# Patient Record
Sex: FEMALE | Race: WHITE | NOT HISPANIC OR LATINO | Employment: OTHER | URBAN - METROPOLITAN AREA
[De-identification: names, ages, dates, MRNs, and addresses within clinical notes are randomized per-mention and may not be internally consistent; named-entity substitution may affect disease eponyms.]

---

## 2017-01-05 ENCOUNTER — PRENATAL OFFICE VISIT (OUTPATIENT)
Dept: OBGYN | Facility: CLINIC | Age: 32
End: 2017-01-05
Payer: COMMERCIAL

## 2017-01-05 ENCOUNTER — OFFICE VISIT (OUTPATIENT)
Dept: MATERNAL FETAL MEDICINE | Facility: CLINIC | Age: 32
End: 2017-01-05
Attending: OBSTETRICS & GYNECOLOGY
Payer: COMMERCIAL

## 2017-01-05 ENCOUNTER — HOSPITAL ENCOUNTER (OUTPATIENT)
Dept: ULTRASOUND IMAGING | Facility: CLINIC | Age: 32
Discharge: HOME | End: 2017-01-05
Attending: OBSTETRICS & GYNECOLOGY | Admitting: OBSTETRICS & GYNECOLOGY
Payer: COMMERCIAL

## 2017-01-05 VITALS
SYSTOLIC BLOOD PRESSURE: 108 MMHG | HEART RATE: 71 BPM | RESPIRATION RATE: 18 BRPM | OXYGEN SATURATION: 99 % | DIASTOLIC BLOOD PRESSURE: 56 MMHG | WEIGHT: 260.4 LBS | TEMPERATURE: 98.1 F | HEIGHT: 67 IN | BODY MASS INDEX: 40.87 KG/M2

## 2017-01-05 DIAGNOSIS — Z34.02 SUPERVISION OF NORMAL FIRST PREGNANCY IN SECOND TRIMESTER: Primary | ICD-10-CM

## 2017-01-05 DIAGNOSIS — O34.592: Primary | ICD-10-CM

## 2017-01-05 DIAGNOSIS — O34.592: ICD-10-CM

## 2017-01-05 PROCEDURE — 76805 OB US >/= 14 WKS SNGL FETUS: CPT

## 2017-01-05 PROCEDURE — 99207 ZZC PRENATAL VISIT: CPT | Performed by: OBSTETRICS & GYNECOLOGY

## 2017-01-05 NOTE — NURSING NOTE
"Chief Complaint   Patient presents with     Prenatal Care     16 weeks 2 days     Imm/Inj     questions regarding whooping cough vaccine-      Initial /56 mmHg  Pulse 71  Temp(Src) 98.1  F (36.7  C) (Oral)  Resp 18  Ht 5' 6.75\" (1.695 m)  Wt 260 lb 6.4 oz (118.117 kg)  BMI 41.11 kg/m2  SpO2 99%  LMP 08/25/2016 Estimated body mass index is 41.11 kg/(m^2) as calculated from the following:    Height as of this encounter: 5' 6.75\" (1.695 m).    Weight as of this encounter: 260 lb 6.4 oz (118.117 kg).  BP completed using cuff size large Right Arm        Jostin Rivers CMA      "

## 2017-01-05 NOTE — PROGRESS NOTES
"Please see \"imaging\" tab under \"Chart Review\" for details of today's US at the [facility].    Ronn Major MD  Maternal-Fetal Medicine    "

## 2017-01-05 NOTE — PROGRESS NOTES
PROBLEM LIST  1. Low risk primip: Apos/RI/first screen:nl    Ultrasound ordered for anatomy. Discussed all results to date: nl first screen, question of uterine anomaly but on further review, just arcuate so no further follow up needed.  Follow up 4 weeks.    Pao Fleming MD

## 2017-01-25 ENCOUNTER — RADIANT APPOINTMENT (OUTPATIENT)
Dept: ULTRASOUND IMAGING | Facility: CLINIC | Age: 32
End: 2017-01-25
Attending: OBSTETRICS & GYNECOLOGY
Payer: COMMERCIAL

## 2017-01-25 DIAGNOSIS — Z34.02 SUPERVISION OF NORMAL FIRST PREGNANCY IN SECOND TRIMESTER: ICD-10-CM

## 2017-01-25 PROCEDURE — 76805 OB US >/= 14 WKS SNGL FETUS: CPT | Performed by: OBSTETRICS & GYNECOLOGY

## 2017-02-03 ENCOUNTER — PRENATAL OFFICE VISIT (OUTPATIENT)
Dept: OBGYN | Facility: CLINIC | Age: 32
End: 2017-02-03
Payer: COMMERCIAL

## 2017-02-03 VITALS
DIASTOLIC BLOOD PRESSURE: 60 MMHG | WEIGHT: 267.8 LBS | HEART RATE: 86 BPM | BODY MASS INDEX: 42.03 KG/M2 | RESPIRATION RATE: 18 BRPM | SYSTOLIC BLOOD PRESSURE: 104 MMHG | TEMPERATURE: 98.2 F | HEIGHT: 67 IN

## 2017-02-03 DIAGNOSIS — Z34.02 SUPERVISION OF NORMAL FIRST PREGNANCY IN SECOND TRIMESTER: Primary | ICD-10-CM

## 2017-02-03 PROCEDURE — 99207 ZZC PRENATAL VISIT: CPT | Performed by: OBSTETRICS & GYNECOLOGY

## 2017-02-03 PROCEDURE — 82105 ALPHA-FETOPROTEIN SERUM: CPT | Mod: 90 | Performed by: OBSTETRICS & GYNECOLOGY

## 2017-02-03 PROCEDURE — 36415 COLL VENOUS BLD VENIPUNCTURE: CPT | Performed by: OBSTETRICS & GYNECOLOGY

## 2017-02-03 PROCEDURE — 99000 SPECIMEN HANDLING OFFICE-LAB: CPT | Performed by: OBSTETRICS & GYNECOLOGY

## 2017-02-03 NOTE — NURSING NOTE
"Chief Complaint   Patient presents with     Prenatal Care     20 weeks 3 days     Derm Problem     bikini area, itching- pt reports sx for the past week, she does have a rash there, has not shaved or waxed area.     Initial /60 mmHg  Pulse 86  Temp(Src) 98.2  F (36.8  C) (Oral)  Resp 18  Ht 5' 6.75\" (1.695 m)  Wt 267 lb 12.8 oz (121.473 kg)  BMI 42.28 kg/m2  LMP 08/25/2016 Estimated body mass index is 42.28 kg/(m^2) as calculated from the following:    Height as of this encounter: 5' 6.75\" (1.695 m).    Weight as of this encounter: 267 lb 12.8 oz (121.473 kg).  BP completed using cuff size large Right Arm      Jostin Rivers CMA      "

## 2017-02-03 NOTE — PROGRESS NOTES
PROBLEM LIST  1. Low risk primip: Apos/RI/first screen:nl AFP:  Ultrasound: within normal limits, BOY.    Reviewed normal anatomy scan: Boy. AFP today after discussion.   Follow up 4 weeks.    Pao Fleming MD

## 2017-02-06 LAB
# FETUSES US: NORMAL
AFP ADJ MOM AMN: 1.02
AFP SERPL-MCNC: 42 NG/ML
AGE - REPORTED: 32.7
DATING METHOD: NORMAL
DIABETIC AT CONCEPTION: NO
FAMILY MEMBER DISEASES HX: NO
FAMILY MEMBER DISEASES HX: NO
GA METHOD: NORMAL
GA: 20.43 WK
HX OF HEREDITARY DISORDERS: NO
IDDM PATIENT QL: NO
INTEGRATED SCN PATIENT-IMP: NORMAL
LMP START DATE: NORMAL
PREV HX CHROMOSOME ABNORMALITY: NO
SPECIMEN DRAWN SERPL: NORMAL
TWINS: NORMAL

## 2017-03-10 ENCOUNTER — PRENATAL OFFICE VISIT (OUTPATIENT)
Dept: OBGYN | Facility: CLINIC | Age: 32
End: 2017-03-10
Payer: COMMERCIAL

## 2017-03-10 VITALS
RESPIRATION RATE: 18 BRPM | DIASTOLIC BLOOD PRESSURE: 60 MMHG | HEART RATE: 86 BPM | BODY MASS INDEX: 43.1 KG/M2 | HEIGHT: 67 IN | WEIGHT: 274.6 LBS | SYSTOLIC BLOOD PRESSURE: 118 MMHG | TEMPERATURE: 97.6 F

## 2017-03-10 DIAGNOSIS — Z34.02 SUPERVISION OF NORMAL FIRST PREGNANCY IN SECOND TRIMESTER: Primary | ICD-10-CM

## 2017-03-10 PROCEDURE — 99207 ZZC PRENATAL VISIT: CPT | Performed by: OBSTETRICS & GYNECOLOGY

## 2017-03-10 NOTE — MR AVS SNAPSHOT
After Visit Summary   3/10/2017    Yudelka Clarke    MRN: [id number]           Patient Information     Date Of Birth          1985        Visit Information        Provider Department      3/10/2017 9:45 AM Pao Fleming MD [facility]        Today's Diagnoses     Supervision of normal first pregnancy in second trimester    -  1       Follow-ups after your visit        Your next 10 appointments already scheduled     Mar 31, 2017 11:15 AM CDT   ESTABLISHED PRENATAL with Pao Fleming MD   [facility]    [address] [place] [id number] [phone]              Who to contact     If you have questions or need follow up information about today's clinic visit or your schedule please contact [facility] directly at [phone].  Normal or non-critical lab and imaging results will be communicated to you by MyChart, letter or phone within 4 business days after the clinic has received the results. If you do not hear from us within 7 days, please contact the clinic through ANDalyzehart or phone. If you have a critical or abnormal lab result, we will notify you by phone as soon as possible.  Submit refill requests through BAT[employer]IES [employer] BANDS or call your pharmacy and they will forward the refill request to us. Please allow 3 business days for your refill to be completed.          Additional Information About Your Visit        MyChart Information     [employer] gives you secure access to your electronic health record. If you see a primary care provider, you can also send messages to your care team and make appointments. If you have questions, please call your primary care clinic.  If you do not have a primary care provider, please call [phone] and they will assist you.        Care EveryWhere ID     This is your Care EveryWhere ID. This could be used by other organizations to access your [facility]  "records  [id number]        Your Vitals Were     Pulse Temperature Respirations Height Last Period BMI (Body Mass Index)    86 97.6  F (36.4  C) (Oral) 18 5' 6.75\" (1.695 m) 08/25/2016 43.33 kg/m2       Blood Pressure from Last 3 Encounters:   03/10/17 118/60   02/03/17 104/60   01/05/17 108/56    Weight from Last 3 Encounters:   03/10/17 274 lb 9.6 oz (124.6 kg)   02/03/17 267 lb 12.8 oz (121.5 kg)   01/05/17 260 lb 6.4 oz (118.1 kg)              Today, you had the following     No orders found for display       Primary Care Provider Office Phone # Fax #    Stevo Laurent Barros [phone]       [facility] [address]  [facility] [address]        Thank you!     Thank you for choosing [facility]  for your care. Our goal is always to provide you with excellent care. Hearing back from our patients is one way we can continue to improve our services. Please take a few minutes to complete the written survey that you may receive in the mail after your visit with us. Thank you!             Your Updated Medication List - Protect others around you: Learn how to safely use, store and throw away your medicines at www.disposemymeds.org.          This list is accurate as of: 3/10/17 10:14 AM.  Always use your most recent med list.                   Brand Name Dispense Instructions for use    PRENATAL PO            "

## 2017-03-10 NOTE — PROGRESS NOTES
PROBLEM LIST  1. Low risk primip: Apos/RI/first screen:nl AFP:  Ultrasound: within normal limits, BOY.    AFP within normal limits. No issues, good fetal movement. Tdap and glucose tolerance test next time.  Follow up 4 weeks.    Pao Fleming MD

## 2017-03-31 ENCOUNTER — PRENATAL OFFICE VISIT (OUTPATIENT)
Dept: OBGYN | Facility: CLINIC | Age: 32
End: 2017-03-31
Payer: COMMERCIAL

## 2017-03-31 VITALS
WEIGHT: 277.2 LBS | BODY MASS INDEX: 43.51 KG/M2 | HEART RATE: 86 BPM | SYSTOLIC BLOOD PRESSURE: 106 MMHG | HEIGHT: 67 IN | OXYGEN SATURATION: 98 % | RESPIRATION RATE: 16 BRPM | DIASTOLIC BLOOD PRESSURE: 70 MMHG

## 2017-03-31 DIAGNOSIS — Z34.03 SUPERVISION OF NORMAL FIRST PREGNANCY IN THIRD TRIMESTER: Primary | ICD-10-CM

## 2017-03-31 LAB
ERYTHROCYTE [DISTWIDTH] IN BLOOD BY AUTOMATED COUNT: 13.2 % (ref 10–15)
HCT VFR BLD AUTO: 34.4 % (ref 35–47)
HGB BLD-MCNC: 11.4 G/DL (ref 11.7–15.7)
MCH RBC QN AUTO: 29.4 PG (ref 26.5–33)
MCHC RBC AUTO-ENTMCNC: 33.1 G/DL (ref 31.5–36.5)
MCV RBC AUTO: 89 FL (ref 78–100)
PLATELET # BLD AUTO: 237 10E9/L (ref 150–450)
RBC # BLD AUTO: 3.88 10E12/L (ref 3.8–5.2)
WBC # BLD AUTO: 11.7 10E9/L (ref 4–11)

## 2017-03-31 PROCEDURE — 85027 COMPLETE CBC AUTOMATED: CPT | Performed by: OBSTETRICS & GYNECOLOGY

## 2017-03-31 PROCEDURE — 90715 TDAP VACCINE 7 YRS/> IM: CPT | Performed by: OBSTETRICS & GYNECOLOGY

## 2017-03-31 PROCEDURE — 36415 COLL VENOUS BLD VENIPUNCTURE: CPT | Performed by: OBSTETRICS & GYNECOLOGY

## 2017-03-31 PROCEDURE — 90471 IMMUNIZATION ADMIN: CPT | Performed by: OBSTETRICS & GYNECOLOGY

## 2017-03-31 PROCEDURE — 99207 ZZC PRENATAL VISIT: CPT | Performed by: OBSTETRICS & GYNECOLOGY

## 2017-03-31 PROCEDURE — 82950 GLUCOSE TEST: CPT | Performed by: OBSTETRICS & GYNECOLOGY

## 2017-03-31 NOTE — PROGRESS NOTES
PROBLEM LIST  1. Low risk primip: Apos/RI/first screen:nl AFP:  Ultrasound: within normal limits, BOY.    Tdap and glucose tolerance test today. Follow up in 2 weeks: will make all appts.  Pao Fleming MD

## 2017-03-31 NOTE — MR AVS SNAPSHOT
"              After Visit Summary   3/31/2017    Yudelka Clarke    MRN: [id number]           Patient Information     Date Of Birth          1985        Visit Information        Provider Department      3/31/2017 11:15 AM Pao Fleming MD [facility]        Today's Diagnoses     Supervision of normal first pregnancy in third trimester    -  1       Follow-ups after your visit        Who to contact     If you have questions or need follow up information about today's clinic visit or your schedule please contact [facility] directly at [phone].  Normal or non-critical lab and imaging results will be communicated to you by Cubiclhart, letter or phone within 4 business days after the clinic has received the results. If you do not hear from us within 7 days, please contact the clinic through Forevert or phone. If you have a critical or abnormal lab result, we will notify you by phone as soon as possible.  Submit refill requests through ZipList or call your pharmacy and they will forward the refill request to us. Please allow 3 business days for your refill to be completed.          Additional Information About Your Visit        MyChart Information     ZipList gives you secure access to your electronic health record. If you see a primary care provider, you can also send messages to your care team and make appointments. If you have questions, please call your primary care clinic.  If you do not have a primary care provider, please call [phone] and they will assist you.        Care EveryWhere ID     This is your Care EveryWhere ID. This could be used by other organizations to access your [facility] medical records  [id number]        Your Vitals Were     Pulse Respirations Height Last Period Pulse Oximetry BMI (Body Mass Index)    86 16 5' 6.75\" (1.695 m) 08/25/2016 98% 43.74 kg/m2       Blood Pressure from Last 3 Encounters:   03/31/17 106/70   03/10/17 118/60   02/03/17 " 104/60    Weight from Last 3 Encounters:   03/31/17 277 lb 3.2 oz (125.7 kg)   03/10/17 274 lb 9.6 oz (124.6 kg)   02/03/17 267 lb 12.8 oz (121.5 kg)              Today, you had the following     No orders found for display       Primary Care Provider Office Phone # Fax #    Stevo Laurent Barros [phone]       [facility] [address]  [facility] [address]        Thank you!     Thank you for choosing [facility]  for your care. Our goal is always to provide you with excellent care. Hearing back from our patients is one way we can continue to improve our services. Please take a few minutes to complete the written survey that you may receive in the mail after your visit with us. Thank you!             Your Updated Medication List - Protect others around you: Learn how to safely use, store and throw away your medicines at www.disposemymeds.org.          This list is accurate as of: 3/31/17 11:40 AM.  Always use your most recent med list.                   Brand Name Dispense Instructions for use    PRENATAL PO

## 2017-03-31 NOTE — NURSING NOTE
"Chief Complaint   Patient presents with     Prenatal Care     28 weeks        Initial /70 (BP Location: Right arm, Patient Position: Chair, Cuff Size: Adult Regular)  Pulse 86  Resp 16  Ht 5' 6.75\" (1.695 m)  Wt 277 lb 3.2 oz (125.7 kg)  LMP 08/25/2016  SpO2 98%  BMI 43.74 kg/m2 Estimated body mass index is 43.74 kg/(m^2) as calculated from the following:    Height as of this encounter: 5' 6.75\" (1.695 m).    Weight as of this encounter: 277 lb 3.2 oz (125.7 kg).  Medication Reconciliation: complete     Malia Reyes CMA    3/31/2017  11:27 AM      "

## 2017-04-01 LAB — GLUCOSE 1H P 50 G GLC PO SERPL-MCNC: 79 MG/DL (ref 60–129)

## 2017-04-10 ENCOUNTER — HOSPITAL ENCOUNTER (INPATIENT)
Facility: CLINIC | Age: 32
LOS: 3 days | Discharge: HOME HEALTH | End: 2017-04-13
Attending: OBSTETRICS & GYNECOLOGY | Admitting: OBSTETRICS & GYNECOLOGY
Payer: COMMERCIAL

## 2017-04-10 ENCOUNTER — TELEPHONE (OUTPATIENT)
Dept: NURSING | Facility: CLINIC | Age: 32
End: 2017-04-10

## 2017-04-10 PROBLEM — Z36.89 ENCOUNTER FOR TRIAGE IN PREGNANT PATIENT: Status: ACTIVE | Noted: 2017-04-10

## 2017-04-10 LAB
ABO + RH BLD: NORMAL
ABO + RH BLD: NORMAL
ALBUMIN UR-MCNC: NEGATIVE MG/DL
APPEARANCE UR: CLEAR
BILIRUB UR QL STRIP: NEGATIVE
BLD GP AB SCN SERPL QL: NORMAL
BLOOD BANK CMNT PATIENT-IMP: NORMAL
COLOR UR AUTO: NORMAL
GLUCOSE UR STRIP-MCNC: NEGATIVE MG/DL
HGB BLD-MCNC: 11.2 G/DL (ref 11.7–15.7)
HGB UR QL STRIP: NEGATIVE
KETONES UR STRIP-MCNC: NEGATIVE MG/DL
LEUKOCYTE ESTERASE UR QL STRIP: NEGATIVE
NITRATE UR QL: NEGATIVE
PH UR STRIP: 7 PH (ref 5–7)
RBC #/AREA URNS AUTO: <1 /HPF (ref 0–2)
SP GR UR STRIP: 1 (ref 1–1.03)
SPECIMEN EXP DATE BLD: NORMAL
T PALLIDUM IGG+IGM SER QL: NEGATIVE
URN SPEC COLLECT METH UR: NORMAL
UROBILINOGEN UR STRIP-MCNC: 0 MG/DL (ref 0–2)
WBC #/AREA URNS AUTO: <1 /HPF (ref 0–2)

## 2017-04-10 PROCEDURE — 85018 HEMOGLOBIN: CPT | Performed by: OBSTETRICS & GYNECOLOGY

## 2017-04-10 PROCEDURE — 86900 BLOOD TYPING SEROLOGIC ABO: CPT | Performed by: OBSTETRICS & GYNECOLOGY

## 2017-04-10 PROCEDURE — 25000125 ZZHC RX 250: Performed by: OBSTETRICS & GYNECOLOGY

## 2017-04-10 PROCEDURE — 86901 BLOOD TYPING SEROLOGIC RH(D): CPT | Performed by: OBSTETRICS & GYNECOLOGY

## 2017-04-10 PROCEDURE — 25000128 H RX IP 250 OP 636: Performed by: OBSTETRICS & GYNECOLOGY

## 2017-04-10 PROCEDURE — 25800025 ZZH RX 258: Performed by: OBSTETRICS & GYNECOLOGY

## 2017-04-10 PROCEDURE — 81001 URINALYSIS AUTO W/SCOPE: CPT | Performed by: OBSTETRICS & GYNECOLOGY

## 2017-04-10 PROCEDURE — 99232 SBSQ HOSP IP/OBS MODERATE 35: CPT | Performed by: NURSE PRACTITIONER

## 2017-04-10 PROCEDURE — 86850 RBC ANTIBODY SCREEN: CPT | Performed by: OBSTETRICS & GYNECOLOGY

## 2017-04-10 PROCEDURE — 36415 COLL VENOUS BLD VENIPUNCTURE: CPT | Performed by: OBSTETRICS & GYNECOLOGY

## 2017-04-10 PROCEDURE — 12000031 ZZH R&B OB CRITICAL

## 2017-04-10 PROCEDURE — 86780 TREPONEMA PALLIDUM: CPT | Performed by: OBSTETRICS & GYNECOLOGY

## 2017-04-10 RX ORDER — MAGNESIUM SULFATE IN WATER 40 MG/ML
2 INJECTION, SOLUTION INTRAVENOUS CONTINUOUS
Status: DISCONTINUED | OUTPATIENT
Start: 2017-04-10 | End: 2017-04-12

## 2017-04-10 RX ORDER — SODIUM CHLORIDE, SODIUM LACTATE, POTASSIUM CHLORIDE, CALCIUM CHLORIDE 600; 310; 30; 20 MG/100ML; MG/100ML; MG/100ML; MG/100ML
INJECTION, SOLUTION INTRAVENOUS CONTINUOUS
Status: DISCONTINUED | OUTPATIENT
Start: 2017-04-10 | End: 2017-04-12

## 2017-04-10 RX ORDER — METHYLERGONOVINE MALEATE 0.2 MG/ML
200 INJECTION INTRAVENOUS
Status: DISCONTINUED | OUTPATIENT
Start: 2017-04-10 | End: 2017-04-12

## 2017-04-10 RX ORDER — FENTANYL CITRATE 50 UG/ML
50-100 INJECTION, SOLUTION INTRAMUSCULAR; INTRAVENOUS
Status: DISCONTINUED | OUTPATIENT
Start: 2017-04-10 | End: 2017-04-12

## 2017-04-10 RX ORDER — ACETAMINOPHEN 325 MG/1
650 TABLET ORAL EVERY 4 HOURS PRN
Status: DISCONTINUED | OUTPATIENT
Start: 2017-04-10 | End: 2017-04-12

## 2017-04-10 RX ORDER — BETAMETHASONE SODIUM PHOSPHATE AND BETAMETHASONE ACETATE 3; 3 MG/ML; MG/ML
12 INJECTION, SUSPENSION INTRA-ARTICULAR; INTRALESIONAL; INTRAMUSCULAR; SOFT TISSUE
Status: COMPLETED | OUTPATIENT
Start: 2017-04-10 | End: 2017-04-11

## 2017-04-10 RX ORDER — NALOXONE HYDROCHLORIDE 0.4 MG/ML
.1-.4 INJECTION, SOLUTION INTRAMUSCULAR; INTRAVENOUS; SUBCUTANEOUS
Status: DISCONTINUED | OUTPATIENT
Start: 2017-04-10 | End: 2017-04-12

## 2017-04-10 RX ORDER — OXYTOCIN/0.9 % SODIUM CHLORIDE 30/500 ML
100-340 PLASTIC BAG, INJECTION (ML) INTRAVENOUS CONTINUOUS PRN
Status: COMPLETED | OUTPATIENT
Start: 2017-04-10 | End: 2017-04-11

## 2017-04-10 RX ORDER — IBUPROFEN 800 MG/1
800 TABLET, FILM COATED ORAL
Status: DISCONTINUED | OUTPATIENT
Start: 2017-04-10 | End: 2017-04-12

## 2017-04-10 RX ORDER — ONDANSETRON 2 MG/ML
4 INJECTION INTRAMUSCULAR; INTRAVENOUS EVERY 6 HOURS PRN
Status: DISCONTINUED | OUTPATIENT
Start: 2017-04-10 | End: 2017-04-12

## 2017-04-10 RX ORDER — OXYTOCIN 10 [USP'U]/ML
10 INJECTION, SOLUTION INTRAMUSCULAR; INTRAVENOUS
Status: DISCONTINUED | OUTPATIENT
Start: 2017-04-10 | End: 2017-04-12

## 2017-04-10 RX ORDER — OXYCODONE AND ACETAMINOPHEN 5; 325 MG/1; MG/1
1 TABLET ORAL
Status: DISCONTINUED | OUTPATIENT
Start: 2017-04-10 | End: 2017-04-12

## 2017-04-10 RX ORDER — MAGNESIUM SULFATE HEPTAHYDRATE 40 MG/ML
4 INJECTION, SOLUTION INTRAVENOUS ONCE
Status: COMPLETED | OUTPATIENT
Start: 2017-04-10 | End: 2017-04-10

## 2017-04-10 RX ORDER — MAGNESIUM SULFATE HEPTAHYDRATE 40 MG/ML
4 INJECTION, SOLUTION INTRAVENOUS
Status: DISCONTINUED | OUTPATIENT
Start: 2017-04-10 | End: 2017-04-12

## 2017-04-10 RX ORDER — CALCIUM GLUCONATE 94 MG/ML
1 INJECTION, SOLUTION INTRAVENOUS
Status: DISCONTINUED | OUTPATIENT
Start: 2017-04-10 | End: 2017-04-12

## 2017-04-10 RX ORDER — CARBOPROST TROMETHAMINE 250 UG/ML
250 INJECTION, SOLUTION INTRAMUSCULAR
Status: DISCONTINUED | OUTPATIENT
Start: 2017-04-10 | End: 2017-04-12

## 2017-04-10 RX ADMIN — SODIUM CHLORIDE, POTASSIUM CHLORIDE, SODIUM LACTATE AND CALCIUM CHLORIDE: 600; 310; 30; 20 INJECTION, SOLUTION INTRAVENOUS at 05:13

## 2017-04-10 RX ADMIN — BETAMETHASONE SODIUM PHOSPHATE AND BETAMETHASONE ACETATE 12 MG: 3; 3 INJECTION, SUSPENSION INTRA-ARTICULAR; INTRALESIONAL; INTRAMUSCULAR at 02:54

## 2017-04-10 RX ADMIN — MAGNESIUM SULFATE IN WATER 4 G: 40 INJECTION, SOLUTION INTRAVENOUS at 02:51

## 2017-04-10 RX ADMIN — MAGNESIUM SULFATE IN WATER 2 G/HR: 40 INJECTION, SOLUTION INTRAVENOUS at 03:25

## 2017-04-10 RX ADMIN — SODIUM CHLORIDE, POTASSIUM CHLORIDE, SODIUM LACTATE AND CALCIUM CHLORIDE 1000 ML: 600; 310; 30; 20 INJECTION, SOLUTION INTRAVENOUS at 22:11

## 2017-04-10 RX ADMIN — SODIUM CHLORIDE, POTASSIUM CHLORIDE, SODIUM LACTATE AND CALCIUM CHLORIDE 500 ML: 600; 310; 30; 20 INJECTION, SOLUTION INTRAVENOUS at 02:25

## 2017-04-10 RX ADMIN — MAGNESIUM SULFATE IN WATER 2 G/HR: 40 INJECTION, SOLUTION INTRAVENOUS at 23:23

## 2017-04-10 RX ADMIN — SODIUM CHLORIDE, POTASSIUM CHLORIDE, SODIUM LACTATE AND CALCIUM CHLORIDE: 600; 310; 30; 20 INJECTION, SOLUTION INTRAVENOUS at 11:18

## 2017-04-10 RX ADMIN — MAGNESIUM SULFATE IN WATER 2 G/HR: 40 INJECTION, SOLUTION INTRAVENOUS at 13:50

## 2017-04-10 NOTE — IP AVS SNAPSHOT
MRN:[id number]                      After Visit Summary   4/10/2017    Yudelka Clarke    MRN: [id number]           Thank you!     Thank you for choosing [facility] for your care. Our goal is always to provide you with excellent care. Hearing back from our patients is one way we can continue to improve our services. Please take a few minutes to complete the written survey that you may receive in the mail after you visit. If you would like to speak to someone directly about your visit please contact Patient Relations at [phone]. Thank you!          Patient Information     Date Of Birth          1985        Designated Caregiver       Most Recent Value    Caregiver    Will someone help with your care after discharge? yes    Name of designated caregiver self and spouse    Phone number of caregiver Jose Angel    Caregiver address [phone]      About your hospital stay     You were admitted on:  April 10, 2017 You last received care in the:  [facility]    You were discharged on:  April 13, 2017       Who to Call     For medical emergencies, please call 911.  For non-urgent questions about your medical care, please call your primary care provider or clinic, [phone]          Attending Provider     Provider Specialty    Jenna Pink MD OB/Gyn    Tom Dodd MD OB/Gyn    Edwin, Sharon Gan MD OB/Gyn    Lonnie, Pao VILLARREAL MD OB/Gyn       Primary Care Provider Office Phone # Fax #    Stevo Laurent Barros [phone]       [facility] [id number] [place] [id number]        Your next 10 appointments already scheduled     Apr 14, 2017  2:15 PM CDT   ESTABLISHED PRENATAL with Pao Fleming MD   [facility] ([facility])    [address] [facility] [id number] [phone]            Apr 28, 2017 11:30 AM CDT   ESTABLISHED PRENATAL with Pao Fleming MD   [facility] ([facility]    [address] [facility] [address]   [phone]            May 12, 2017 11:15 AM CDT   ESTABLISHED PRENATAL with Pao Fleming MD   [facility]    [address] [place] [address]   [phone]            May 26, 2017 11:15 AM CDT   ESTABLISHED PRENATAL with Pao Fleming MD   [facility]    [address] [place] [address]   [phone]            Jun 01, 2017 11:15 AM CDT   ESTABLISHED PRENATAL with Pao Fleming MD   [facility]    [address] [place] [phone]   [phone]            Jun 09, 2017 11:15 AM CDT   ESTABLISHED PRENATAL with Pao Fleming MD   [facility]    [address] [place] [address]   [phone]            Jun 16, 2017 11:15 AM CDT   ESTABLISHED PRENATAL with Pao Fleming MD   [facility]    [id number] [place] [address]   [phone]              Further instructions from your care team       Postpartum Vaginal Delivery Instructions  [facility] [phone]  Lactation Dept [phone]    Activity       Ask family and friends for help when you need it.    Do not place anything in your vagina for 6 weeks.    You are not restricted on other activities, but take it easy for a few weeks to allow your body to recover from delivery.  You are able to do any activities you feel up to that point.    No driving until you have stopped taking your pain medications (usually two weeks after delivery).     Call your health care provider if you have any of these symptoms:       Increased pain, swelling, redness, or fluid around your stiches from an  episiotomy or perineal tear.    A fever above 100.4 F (38 C) with or without chills when placing a thermometer under your tongue.    You soak a sanitary pad with blood within 1 hour, or you see blood clots larger than a golf ball.    Bleeding that lasts more than 6 weeks.    Vaginal discharge that smells bad.    Severe pain, cramping or tenderness in your lower belly area.    A need to urinate more frequently (use the toilet more often), more urgently (use the toilet very quickly), or it burns when you urinate.    Nausea and vomiting.    Redness, swelling or pain around a vein in your leg.    Problems breastfeeding or a red or painful area on your breast.    Chest pain and cough or are gasping for air.    Problems coping with sadness, anxiety, or depression.  If you have any concerns about hurting yourself or the baby, call your provider immediately.     You have questions or concerns after you return home.     Keep your hands clean:  Always wash your hands before touching your perineal area and stitches.  This helps reduce your risk of infection.  If your hands aren't dirty, you may use an alcohol hand-rub to clean your hands. Keep your nails clean and short.            Pending Results     Date and Time Order Name Status Description    4/11/2017 2341 Genital Special Culture Aerob Bacterial Preliminary     4/11/2017 2341 Genital Special Culture Aerob Bacterial Preliminary     4/11/2017 2341 Placenta Path Order and Indications (PLACENTA) In process     4/11/2017 1235 Referral sensitivity In process             Statement of Approval     Ordered          04/13/17 0902  I have reviewed and agree with all the recommendations and orders detailed in this document.  EFFECTIVE NOW     Approved and electronically signed by:  Pao Fleming MD             Admission Information     Date & Time Provider Department Dept. Phone    4/10/2017 Pao Fleming MD [facility] [phone]      Your Vitals Were      "Blood Pressure Temperature Respirations Height Weight Last Period    136/73 97.8  F (36.6  C) (Oral) 18 1.702 m (5' 7\") 125.6 kg (277 lb) 08/25/2016    Pulse Oximetry BMI (Body Mass Index)                95% 43.38 kg/m2          [employer] Information     [employer] gives you secure access to your electronic health record. If you see a primary care provider, you can also send messages to your care team and make appointments. If you have questions, please call your primary care clinic.  If you do not have a primary care provider, please call [phone] and they will assist you.        Care EveryWhere ID     This is your Care EveryWhere ID. This could be used by other organizations to access your [facility] medical records  [id number]           Review of your medicines      START taking        Dose / Directions    acetaminophen 325 MG tablet   Commonly known as:  TYLENOL        Dose:  650 mg   Take 2 tablets (650 mg) by mouth every 4 hours as needed for mild pain or fever (greater than or equal to 38? C /100.4? F (oral) or 38.5? C/ 101.4? F (core).)   Quantity:  100 tablet   Refills:  0       ibuprofen 200 MG tablet   Commonly known as:  ADVIL/MOTRIN        Dose:  600 mg   Take 3 tablets (600 mg) by mouth every 6 hours as needed for other (cramping)   Refills:  0       senna-docusate 8.6-50 MG per tablet   Commonly known as:  SENOKOT-S;PERICOLACE        Dose:  1-2 tablet   Take 1-2 tablets by mouth 2 times daily   Quantity:  100 tablet   Refills:  0         CONTINUE these medicines which have NOT CHANGED        Dose / Directions    PRENATAL PO        Refills:  0            Where to get your medicines      Some of these will need a paper prescription and others can be bought over the counter. Ask your nurse if you have questions.     You don't need a prescription for these medications     acetaminophen 325 MG tablet    ibuprofen 200 MG tablet    senna-docusate 8.6-50 MG per tablet                Protect others around you: " Learn how to safely use, store and throw away your medicines at www.disposemymeds.org.             Medication List: This is a list of all your medications and when to take them. Check marks below indicate your daily home schedule. Keep this list as a reference.      Medications           Morning Afternoon Evening Bedtime As Needed    acetaminophen 325 MG tablet   Commonly known as:  TYLENOL   Take 2 tablets (650 mg) by mouth every 4 hours as needed for mild pain or fever (greater than or equal to 38? C /100.4? F (oral) or 38.5? C/ 101.4? F (core).)                                ibuprofen 200 MG tablet   Commonly known as:  ADVIL/MOTRIN   Take 3 tablets (600 mg) by mouth every 6 hours as needed for other (cramping)                                PRENATAL PO   Last time this was given:  1 tablet on 4/13/2017  8:45 AM                                senna-docusate 8.6-50 MG per tablet   Commonly known as:  SENOKOT-S;PERICOLACE   Take 1-2 tablets by mouth 2 times daily   Last time this was given:  1 tablet on 4/13/2017  8:45 AM

## 2017-04-10 NOTE — H&P
[facility] Labor and Delivery History and Physical    Yudelka Clarke MRN# [id number]   Age: 31 year old YOB: 1985     Date of Admission:  4/10/2017    Primary care provider: Stevo Barros           Chief Complaint:   Yudelka Clarke is a 31 year old white female  Estimated Date of Delivery: 2017  who is 29w6d pregnant and being admitted for  labor.  The pt states that she began having regular ctx's about 7 pm last evening.  The ctx's subsided with fluids and rest but awoke her at 12:00 Midnight.  The pt then called and was asked to come to L&D for eval.  She denies known ppt'g event, recent intercourse or ROM.  She denies bowel or bladder sx's or complications this preg          Pregnancy history:     OBSTETRIC HISTORY:    Obstetric History       T0      TAB0   SAB0   E0   M0   L0       # Outcome Date GA Lbr Jose Luis/2nd Weight Sex Delivery Anes PTL Lv   1 Current                   EDC: Estimated Date of Delivery: 17    Prenatal Labs:   Lab Results   Component Value Date    ABO A 04/10/2017    RH  Pos 04/10/2017    AS Neg 04/10/2017    HEPBANG Nonreactive 2016    CHPCRT  2016     Negative   Negative for C. trachomatis rRNA by transcription mediated amplification.   A negative result by transcription mediated amplification does not preclude the   presence of C. trachomatis infection because results are dependent on proper   and adequate collection, absence of inhibitors, and sufficient rRNA to be   detected.      GCPCRT  2016     Negative   Negative for N. gonorrhoeae rRNA by transcription mediated amplification.   A negative result by transcription mediated amplification does not preclude the   presence of N. gonorrhoeae infection because results are dependent on proper   and adequate collection, absence of inhibitors, and sufficient rRNA to be   detected.      TREPAB Negative 2016    HGB 11.2 (L) 04/10/2017       GBS  Status:   No results found for: GBS    Active Problem List  Patient Active Problem List   Diagnosis     Mandibular hypoplasia     PCOS (polycystic ovarian syndrome)     Morbid obesity (H)     Blood type A+     Obesity in pregnancy     Encounter for triage in pregnant patient     Indication for care in labor or delivery       Medication Prior to Admission  Prescriptions Prior to Admission   Medication Sig Dispense Refill Last Dose     Prenatal Vit-Fe Fumarate-FA (PRENATAL PO)    Taking   .        Maternal Past Medical History:     Past Medical History:   Diagnosis Date     Blood type A+      Left forearm fracture     3 years old - cast     Right forearm fracture     4 years old - cast                       Family History:   I have reviewed this patient's family history            Social History:   I have reviewed this patient's social history         Review of Systems:   C: NEGATIVE for fever, chills, change in weight  I: NEGATIVE for worrisome rashes, moles or lesions  E: NEGATIVE for vision changes or irritation  E/M: NEGATIVE for ear, mouth and throat problems  R: NEGATIVE for significant cough or SOB  B: NEGATIVE for masses, tenderness or discharge  CV: NEGATIVE for chest pain, palpitations or peripheral edema  GI: NEGATIVE for nausea, abdominal pain, heartburn, or change in bowel habits  : NEGATIVE for frequency, dysuria, or hematuria  M: NEGATIVE for significant arthralgias or myalgia  N: NEGATIVE for weakness, dizziness or paresthesias  E: NEGATIVE for temperature intolerance, skin/hair changes  H: NEGATIVE for bleeding problems  P: NEGATIVE for changes in mood or affect          Physical Exam:   Vitals were reviewed  All vitals stable  Temp: 98.5  F (36.9  C) Temp src: Oral BP: 113/59     Resp: 16 SpO2: 95 %      Constitutional:   awake, alert, cooperative, no apparent distress, and appears stated age     Eyes:   Lids and lashes normal, pupils equal, round and reactive to light, extra ocular muscles intact,  sclera clear, conjunctiva normal     ENT:   Normocephalic, without obvious abnormality, atraumatic, sinuses nontender on palpation, external ears without lesions, oral pharynx with moist mucous membranes, tonsils without erythema or exudates, gums normal and good dentition.     Neck:   Supple, symmetrical, trachea midline, no adenopathy, thyroid symmetric, not enlarged and no tenderness, skin normal     Back:   Symmetric, no curvature, spinous processes are non-tender on palpation, paraspinous muscles are non-tender on palpation, no costal vertebral tenderness     Lungs:   No increased work of breathing, good air exchange, clear to auscultation bilaterally, no crackles or wheezing     Cardiovascular:   Normal apical impulse, regular rate and rhythm, normal S1 and S2, no S3 or S4, and no murmur noted     Abdomen:   No scars, normal bowel sounds, gravid uterus consistent with dates, barrera infant vtx (confirmed by U/S this am), non-tender, no masses palpated, no hepatosplenomegally  EFW 2 #     Genitounirinary:   External Genitalia:  General appearance; normal     Musculoskeletal:   There is no redness, warmth, or swelling of the joints.  Full range of motion noted.  Motor strength is 5 out of 5 all extremities bilaterally.  Tone is normal.     Neurologic:   Awake, alert, oriented to name, place and time.  Cranial nerves II-XII are grossly intact.  Motor is 5 out of 5 bilaterally.  Cerebellar finger to nose, heel to shin intact.  Sensory is intact.  Babinski down going, Romberg negative, and gait is normal.      Cervix:   Membranes: intact   Dilation: 3   Effacement: 90%   Station:-2   Consistency: average   Position: Mid  Presentation:Cephalic  Fetal Heart Rate Tracing: reactive and reassuring  Tocometer: external monitor and now irregular                       Assessment:   Yudelka Clarke is a 29w6d pregnant female admitted with  labor.          Plan:   Admit - see IP orders  MgSO4 and betamethasone  per protocol  Risks, benefits, and alternative modes of therapy discussed at length. Pathophysiology of the disease process reviewed, all of the patients questions answered and informed consent obtained.  Issues of prematurity reviewed   NNP to see pt  MFM consult  May consider deejay Dodd MD

## 2017-04-10 NOTE — PROVIDER NOTIFICATION
04/10/17 0822   Provider Notification   Provider Name/Title PERLA   Method of Notification At Bedside   Request Evaluate in Person   Notification Reason Status Update   US confirmed vertex resenting .  POC discussed with pt-will continue Magnesium Sulfate and give 2nd dose of Betamethsone as scheduled.  Consult for NNP will be placed.  Discussed GBS culture.  MD requested it be done prior to next cervical exam.

## 2017-04-10 NOTE — TELEPHONE ENCOUNTER
Call Type: Triage Call    Presenting Problem: 30 weeks pregnant, Woke with abdominal cramping,  off and on. Denies vaginal bleeding. No vomiting or fever. Has not  felt fetal movement for the last 30 minutes since she has been  awake. On call OB paged to call patient at [phone] @ 0119  Triage Note:  Guideline Title: Pregnancy: Abdominal Pain  Recommended Disposition: Call Provider Immediately  Original Inclination: Wanted to speak with a nurse  Override Disposition:  Intended Action: Call PCP/HCP  Physician Contacted: No  Generalized abdominal pain that progresses to localized pain ?  YES  Signs of dehydration ? NO  IUD or cerclage in place ? NO  Fluid leaking or rupture of membranes ? NO  Severe breathing problems ? NO  Recent version procedure ? NO  New or worsening signs and symptoms that may indicate shock ? NO  Less than 20 weeks gestation AND vaginal bleeding ? NO  More than 20 weeks gestation AND vaginal bleeding ? NO  Gestation more than 37 weeks AND signs of labor ? NO  Gestation less than 20 weeks AND signs of labor ? NO  20-37 weeks gestation AND signs of labor ? NO  Sudden onset or recurring abdominal pain that radiates to upper back or shoulder ?  NO  Passing red, black or tarry material from rectum AND onset of new signs and  symptoms of hypovolemia ? NO  Unbearable abdominal/pelvic pain or cramping ? NO  Any cardiac signs/symptoms for more than 5 minutes, now or within last hour ? NO  Vomiting red, bloody or coffee-ground material, more than streaks of blood or  scant amount (not following nosebleed within past day) ? NO  Temperature 100 F (37.7 C) or greater ? NO  Burning sensation in the epigastic area AND no other abdominal discomfort or other  related symptoms ? NO  Generalized abdominal pain that progresses to localized pain AND any of the  following: loss of appetite, vomiting starting after pain, any fever, OR unable  to carry out normal activities ? NO  Abdominal pain that has steadily  "worsened over hours OR has been continuous for 3  hours or more AND any of the following: loss of appetite, vomiting starting after  pain, any fever, OR unable to carry out normal activities ? NO  Sudden onset or recurring abdominal pain that radiates to upper back or shoulder  AND any of the following: loss of appetite, vomiting starting after pain, any  fever, OR unable to carry out normal activities ? NO  Pain in flank, low back, over bladder, groin or perineum AND sharp pain with  urination, or felt something \"pass\" with urination, or blood in urine ? NO  Recent amniocentesis/chorionic villus sampling ([facility]) ? NO  New onset of epigastric or right upper quadrant pain with known pregnancy induced  hypertension ? NO  Continuous bright red vaginal bleeding for more than 15 minutes (more than  spotting) ? NO  Gestation 20 weeks or more AND decreased fetal movement compared to previous  activity ? NO  Unbearable headache ? NO  Abdominal or pelvic pain that has steadily worsened; has been continuous for 3  hours or more; OR that had stopped and has now returned ? NO  Injury to abdomen or pelvis ? NO  Physician Instructions:  Care Advice:  "

## 2017-04-10 NOTE — PLAN OF CARE
Data: Patient presented to [facility] at 0203.   Reason for maternal/fetal assessment per patient is Rule Out Labor  .  Patient is a . Prenatal record reviewed.      Obstetric History       T0      TAB0   SAB0   E0   M0   L0       # Outcome Date GA Lbr Jose Luis/2nd Weight Sex Delivery Anes PTL Lv   1 Current               . Medical history:   Past Medical History:   Diagnosis Date     Blood type A+      Left forearm fracture     3 years old - cast     Right forearm fracture     4 years old - cast   . Gestational Age 29w6d. VSS. Fetal movement present. Patient denies backache, vaginal discharge, pelvic pressure, UTI symptoms, GI problems, bloody show, vaginal bleeding, edema, headache, visual disturbances, epigastric or URQ pain, abdominal pain, rupture of membranes. Support persons significan other  present.  Action: Verbal consent for EFM. Triage assessment completed. EFM applied for fetal assessment. Uterine assessment contractions every. Fetal assessment: Presumed adequate fetal oxygenation documented (see flow record).   Response: Dr. Pink informed of status. Plan per provider is admit to inpatient, administer magnesium sulfate and betamethasone, and recheck cervix 2 hours after bolus started. Patient verbalized agreement with plan. Patient transferred to room 404 ambulatory, oriented to room and call light.

## 2017-04-10 NOTE — CONSULTS
Neonatology Antepartum Consultation    Yudelka Clarke MRN# [id number]   YOB: 1985 Age: 31 year old   Date of Admission: 4/10/2017     Reason for consult: I was asked by Dr. Dodd  to evaluate this patient for  risk of delivery at 29w6d.         I was asked to provide antepartum counseling for Yudelka Clarke at the request of Dr. Dodd secondary to  labor.  She is currently 29w6d weeks and has a history significant PTL with buldging bag. She was admitted on 4/10/2017.. Betamethasone was administered on 4/10 ay 0300 with second dose anticipated 300. Ms. Clarke, accompanied by her spouse, was counseled on the expected hospital course, potential risks, and outcomes associated with an infant born at approximately 29-30 weeks gestation. The counseling included: morbidity,  initial delivery room stabilization, respiratory course, lung development, chronic lung disease,  infection  intraventricular hemorrhage,  nutrition, growth and development. Please feel free to call with any additional questions or concerns. Face to fact time 55 minutes  Leny WINN, CNP  4/10/2017 , 4:03 PM.

## 2017-04-10 NOTE — PROVIDER NOTIFICATION
04/10/17 0740   Provider Notification   Provider Name/Title PERLA   Method of Notification In Department

## 2017-04-10 NOTE — PROVIDER NOTIFICATION
04/10/17 0600   Provider Notification   Provider Name/Title Dr Dodd   Method of Notification Phone   Request Evaluate - Remote   Notification Reason Membrane Status;SVE;Status Update     Dr Dodd called department.  Informed of SVE that shows no change, bulging membranes, and inability to assess whether is vertex presentation.  MD plans on performing bedside ultrasound when rounding.  Category 1 tracing.

## 2017-04-10 NOTE — PROVIDER NOTIFICATION
04/10/17 0230   Provider Notification   Provider Name/Title Dr Pink   Method of Notification Phone   Request Evaluate - Remote   Notification Reason Patient Arrived;Decels;Uterine Activity;SVE;Status Update     Dr Pink updated on arrival, SVE of 3cm, 90 % effaced and -2 station.  Noted bulging bag with cervical exam, unable to verify vertex.  Category 1 tracing with the exception of 1 variable deceleration.  Contractions every 3-5 minutes and palpate mild to moderate.  Difficult to palpate at times due to patient size.  Plan to admit to inpatient, give magnesium sulfate bolus then continuous.  Administer betamethasone.  Also have NNP come and consult with pt.  Plan to recheck cervix 2 hours after magnesium started.

## 2017-04-10 NOTE — PROVIDER NOTIFICATION
04/10/17 0515   Provider Notification   Provider Name/Title Dr Pink   Method of Notification Electronic Page  (text page)   Request Evaluate - Remote   Notification Reason SVE;Labor Status;Other (Comment)     Text page sent to dr pink informing of SVE that is no change from previously, category 1 tracing, contractions spacing out some and pain is now 2/10 instead of 5/10.  With SVE unable to assess vertex presentation.  Informed MD to call if needed ultrasound during the night.

## 2017-04-10 NOTE — IP AVS SNAPSHOT
[facility]    [address]    [facility] [address]    Phone:  [phone]    Fax:  [phone]                                       After Visit Summary   4/10/2017    Yudelka Clarke    MRN: [id number]           After Visit Summary Signature Page     I have received my discharge instructions, and my questions have been answered. I have discussed any challenges I see with this plan with the nurse or doctor.    ..........................................................................................................................................  Patient/Patient Representative Signature      ..........................................................................................................................................  Patient Representative Print Name and Relationship to Patient    ..................................................               ................................................  Date                                            Time    ..........................................................................................................................................  Reviewed by Signature/Title    ...................................................              ..............................................  Date                                                            Time

## 2017-04-11 ENCOUNTER — APPOINTMENT (OUTPATIENT)
Dept: ULTRASOUND IMAGING | Facility: CLINIC | Age: 32
End: 2017-04-11
Attending: OBSTETRICS & GYNECOLOGY
Payer: COMMERCIAL

## 2017-04-11 PROCEDURE — 87070 CULTURE OTHR SPECIMN AEROBIC: CPT | Performed by: OBSTETRICS & GYNECOLOGY

## 2017-04-11 PROCEDURE — 0UQMXZZ REPAIR VULVA, EXTERNAL APPROACH: ICD-10-PCS | Performed by: OBSTETRICS & GYNECOLOGY

## 2017-04-11 PROCEDURE — 87653 STREP B DNA AMP PROBE: CPT | Performed by: OBSTETRICS & GYNECOLOGY

## 2017-04-11 PROCEDURE — 76815 OB US LIMITED FETUS(S): CPT

## 2017-04-11 PROCEDURE — 25800025 ZZH RX 258: Performed by: OBSTETRICS & GYNECOLOGY

## 2017-04-11 PROCEDURE — 88307 TISSUE EXAM BY PATHOLOGIST: CPT | Mod: 26 | Performed by: OBSTETRICS & GYNECOLOGY

## 2017-04-11 PROCEDURE — 12000031 ZZH R&B OB CRITICAL

## 2017-04-11 PROCEDURE — 25000132 ZZH RX MED GY IP 250 OP 250 PS 637: Performed by: OBSTETRICS & GYNECOLOGY

## 2017-04-11 PROCEDURE — 72200001 ZZH LABOR CARE VAGINAL DELIVERY SINGLE

## 2017-04-11 PROCEDURE — 25000128 H RX IP 250 OP 636: Performed by: OBSTETRICS & GYNECOLOGY

## 2017-04-11 PROCEDURE — 87186 SC STD MICRODIL/AGAR DIL: CPT | Performed by: OBSTETRICS & GYNECOLOGY

## 2017-04-11 PROCEDURE — 25000125 ZZHC RX 250: Performed by: OBSTETRICS & GYNECOLOGY

## 2017-04-11 PROCEDURE — 59400 OBSTETRICAL CARE: CPT | Performed by: OBSTETRICS & GYNECOLOGY

## 2017-04-11 PROCEDURE — 88307 TISSUE EXAM BY PATHOLOGIST: CPT | Performed by: OBSTETRICS & GYNECOLOGY

## 2017-04-11 RX ORDER — PENICILLIN G POTASSIUM 5000000 [IU]/1
5 INJECTION, POWDER, FOR SOLUTION INTRAMUSCULAR; INTRAVENOUS ONCE
Status: COMPLETED | OUTPATIENT
Start: 2017-04-11 | End: 2017-04-11

## 2017-04-11 RX ORDER — PRENATAL VIT/IRON FUM/FOLIC AC 27MG-0.8MG
1 TABLET ORAL DAILY
Status: DISCONTINUED | OUTPATIENT
Start: 2017-04-11 | End: 2017-04-12

## 2017-04-11 RX ORDER — DOCUSATE SODIUM 100 MG/1
100 CAPSULE, LIQUID FILLED ORAL 2 TIMES DAILY
Status: DISCONTINUED | OUTPATIENT
Start: 2017-04-11 | End: 2017-04-12

## 2017-04-11 RX ADMIN — MAGNESIUM SULFATE IN WATER 2 G/HR: 40 INJECTION, SOLUTION INTRAVENOUS at 20:57

## 2017-04-11 RX ADMIN — LIDOCAINE HYDROCHLORIDE 10 ML: 10 INJECTION, SOLUTION INFILTRATION; PERINEURAL at 02:22

## 2017-04-11 RX ADMIN — OXYTOCIN-SODIUM CHLORIDE 0.9% IV SOLN 30 UNIT/500ML 340 ML/HR: 30-0.9/5 SOLUTION at 22:22

## 2017-04-11 RX ADMIN — PRENATAL VIT W/ FE FUMARATE-FA TAB 27-0.8 MG 1 TABLET: 27-0.8 TAB at 13:44

## 2017-04-11 RX ADMIN — PENICILLIN G POTASSIUM 5 MILLION UNITS: 5000000 POWDER, FOR SOLUTION INTRAMUSCULAR; INTRAPLEURAL; INTRATHECAL; INTRAVENOUS at 21:59

## 2017-04-11 RX ADMIN — SODIUM CHLORIDE, POTASSIUM CHLORIDE, SODIUM LACTATE AND CALCIUM CHLORIDE: 600; 310; 30; 20 INJECTION, SOLUTION INTRAVENOUS at 12:40

## 2017-04-11 RX ADMIN — MAGNESIUM SULFATE IN WATER 2 G/HR: 40 INJECTION, SOLUTION INTRAVENOUS at 10:21

## 2017-04-11 RX ADMIN — DOCUSATE SODIUM 100 MG: 100 CAPSULE, LIQUID FILLED ORAL at 13:45

## 2017-04-11 RX ADMIN — BETAMETHASONE SODIUM PHOSPHATE AND BETAMETHASONE ACETATE 12 MG: 3; 3 INJECTION, SUSPENSION INTRA-ARTICULAR; INTRALESIONAL; INTRAMUSCULAR at 03:05

## 2017-04-11 NOTE — PROGRESS NOTES
"[facility]   Antepartum Progress Note          Assessment and Plan:    Assessment:   HD#2:  labor.   Admitted with regular uterine contractions, painful, with cervical dilation 3/90/-2.   Started on MgSO4 for tocolysis/neuroprotection, Betamethasone (second dose received this AM at approximately 3 am  Doing well, decreased contractions, denies pain, loss of fluid, vaginal bleeding.   +fetal movement.               Plan:   Betamethasone completed; will continue MgSO4 until 24 hours following last dose of betamethasone.      Will order growth scan, can do at bedside while on MgSO4  Will do GBS test today.                  Significant Problems:      Patient Active Problem List   Diagnosis     Mandibular hypoplasia     PCOS (polycystic ovarian syndrome)     Morbid obesity (H)     Blood type A+     Obesity in pregnancy     Encounter for triage in pregnant patient     Indication for care in labor or delivery             Review of Systems:    The Review of Systems is negative other than noted in the HPI          Medications:   All medications related to the patient's surgery have been reviewed  Current Facility-Administered Medications   Medication     lactated ringers infusion     lactated ringers BOLUS 1,000 mL    Or     lactated ringers BOLUS 500 mL     acetaminophen (TYLENOL) tablet 650 mg     naloxone (NARCAN) injection 0.1-0.4 mg     ondansetron (ZOFRAN) injection 4 mg     oxytocin (PITOCIN) injection 10 Units     oxytocin (PITOCIN) 30 units in 500 mL 0.9% NaCl infusion     Medication Instructions: misoprostol (CYTOTEC)- Nurse to discuss ordering with provider, if needed. Ordered via \"OB misoprostol (CYTOTEC) Postpartum Hemorrhage PANEL\"     methylergonovine (METHERGINE) injection 200 mcg     carboprost (HEMABATE) injection 250 mcg     lidocaine 1 % 0.1-20 mL     ibuprofen (ADVIL/MOTRIN) tablet 800 mg     oxyCODONE-acetaminophen (PERCOCET) 5-325 MG per tablet 1 tablet     nitrous oxide/oxygen " "50/50 blend     fentaNYL Citrate (PF) (SUBLIMAZE) injection  mcg     magnesium sulfate 20 g in 500 mL infusion     calcium gluconate 10 % injection 1 g     magnesium sulfate 2 g in NS intermittent infusion ([facility] or [facility] C[facility])     magnesium sulfate 4 g in 100 mL sterile water (premade)             Physical Exam:   All vitals stable    Vital signs:  Temp: 98.4  F (36.9  C) Temp src: Oral BP: 109/63   Heart Rate: 84 Resp: 18       Height: 170.2 cm (5' 7\") Weight: 125.6 kg (277 lb)  Estimated body mass index is 43.38 kg/(m^2) as calculated from the following:    Height as of this encounter: 1.702 m (5' 7\").    Weight as of this encounter: 125.6 kg (277 lb).    Physical exam:  GENERAL APPEARANCE: healthy, alert and no distress  RESP: lungs clear to auscultation - no rales, rhonchi or wheezes  CV: regular rates and rhythm, normal S1 S2, no S3 or S4 and no murmur, click or rub -  ABDOMEN: gravid, soft    CX: 3/90/-2/VTX    External monitoring; ctx Q 8-9 min, mild;  bpm with with moderate variability,+accels.         Data:     All laboratory data related to this surgery reviewed  Hemoglobin   Date Value Ref Range Status   04/10/2017 11.2 (L) 11.7 - 15.7 g/dL Final   03/31/2017 11.4 (L) 11.7 - 15.7 g/dL Final     Comment:     Results confirmed by repeat test   11/16/2016 12.2 11.7 - 15.7 g/dL Final   12/30/2015 13.1 11.7 - 15.7 g/dL Final       Sharon Pineda MD    "

## 2017-04-11 NOTE — PLAN OF CARE
Pt reported that she rested at night,rating her pain same b/w 1-2. Bedside report given to Miesha FOY RN who assumes cares.

## 2017-04-11 NOTE — PROVIDER NOTIFICATION
04/11/17 1135   Provider Notification   Provider Name/Title DEJAN   Method of Notification At Bedside   Notification Reason Other (Comment)   DISCUSSED POC.  ORDERS RECEIVED FOR CONTINUOUS UTERINE TOCO AND BID NST, MAGNESIUM OFF AT 0300, GROWTH US @ BEDSIDE, GBS VAGINAL CULTURE & MEDS.

## 2017-04-12 LAB
GP B STREP DNA SPEC QL NAA+PROBE: ABNORMAL
SPECIMEN SOURCE: ABNORMAL

## 2017-04-12 PROCEDURE — 25000132 ZZH RX MED GY IP 250 OP 250 PS 637: Performed by: OBSTETRICS & GYNECOLOGY

## 2017-04-12 PROCEDURE — 40000083 ZZH STATISTIC IP LACTATION SERVICES 1-15 MIN

## 2017-04-12 PROCEDURE — 12000027 ZZH R&B OB

## 2017-04-12 RX ORDER — OXYTOCIN/0.9 % SODIUM CHLORIDE 30/500 ML
340 PLASTIC BAG, INJECTION (ML) INTRAVENOUS CONTINUOUS PRN
Status: DISCONTINUED | OUTPATIENT
Start: 2017-04-12 | End: 2017-04-13 | Stop reason: HOSPADM

## 2017-04-12 RX ORDER — ACETAMINOPHEN 325 MG/1
650 TABLET ORAL EVERY 4 HOURS PRN
Status: DISCONTINUED | OUTPATIENT
Start: 2017-04-12 | End: 2017-04-13 | Stop reason: HOSPADM

## 2017-04-12 RX ORDER — NALOXONE HYDROCHLORIDE 0.4 MG/ML
.1-.4 INJECTION, SOLUTION INTRAMUSCULAR; INTRAVENOUS; SUBCUTANEOUS
Status: DISCONTINUED | OUTPATIENT
Start: 2017-04-12 | End: 2017-04-13 | Stop reason: HOSPADM

## 2017-04-12 RX ORDER — LANOLIN 100 %
OINTMENT (GRAM) TOPICAL
Status: DISCONTINUED | OUTPATIENT
Start: 2017-04-12 | End: 2017-04-13 | Stop reason: HOSPADM

## 2017-04-12 RX ORDER — OXYCODONE HYDROCHLORIDE 5 MG/1
5-10 TABLET ORAL
Status: DISCONTINUED | OUTPATIENT
Start: 2017-04-12 | End: 2017-04-13 | Stop reason: HOSPADM

## 2017-04-12 RX ORDER — MISOPROSTOL 200 UG/1
400 TABLET ORAL
Status: DISCONTINUED | OUTPATIENT
Start: 2017-04-12 | End: 2017-04-13 | Stop reason: HOSPADM

## 2017-04-12 RX ORDER — BISACODYL 10 MG
10 SUPPOSITORY, RECTAL RECTAL DAILY PRN
Status: DISCONTINUED | OUTPATIENT
Start: 2017-04-13 | End: 2017-04-13 | Stop reason: HOSPADM

## 2017-04-12 RX ORDER — HYDROCORTISONE 2.5 %
CREAM (GRAM) TOPICAL 3 TIMES DAILY PRN
Status: DISCONTINUED | OUTPATIENT
Start: 2017-04-12 | End: 2017-04-13 | Stop reason: HOSPADM

## 2017-04-12 RX ORDER — IBUPROFEN 400 MG/1
400-800 TABLET, FILM COATED ORAL EVERY 6 HOURS PRN
Status: DISCONTINUED | OUTPATIENT
Start: 2017-04-12 | End: 2017-04-13 | Stop reason: HOSPADM

## 2017-04-12 RX ORDER — PRENATAL VIT/IRON FUM/FOLIC AC 27MG-0.8MG
1 TABLET ORAL DAILY
Status: DISCONTINUED | OUTPATIENT
Start: 2017-04-12 | End: 2017-04-13 | Stop reason: HOSPADM

## 2017-04-12 RX ORDER — AMOXICILLIN 250 MG
1-2 CAPSULE ORAL 2 TIMES DAILY
Status: DISCONTINUED | OUTPATIENT
Start: 2017-04-12 | End: 2017-04-13 | Stop reason: HOSPADM

## 2017-04-12 RX ORDER — OXYTOCIN/0.9 % SODIUM CHLORIDE 30/500 ML
100 PLASTIC BAG, INJECTION (ML) INTRAVENOUS CONTINUOUS
Status: DISCONTINUED | OUTPATIENT
Start: 2017-04-12 | End: 2017-04-13 | Stop reason: HOSPADM

## 2017-04-12 RX ORDER — OXYTOCIN 10 [USP'U]/ML
10 INJECTION, SOLUTION INTRAMUSCULAR; INTRAVENOUS
Status: DISCONTINUED | OUTPATIENT
Start: 2017-04-12 | End: 2017-04-13 | Stop reason: HOSPADM

## 2017-04-12 RX ADMIN — PRENATAL VIT W/ FE FUMARATE-FA TAB 27-0.8 MG 1 TABLET: 27-0.8 TAB at 09:11

## 2017-04-12 RX ADMIN — SENNOSIDES AND DOCUSATE SODIUM 1 TABLET: 8.6; 5 TABLET ORAL at 09:11

## 2017-04-12 RX ADMIN — SENNOSIDES AND DOCUSATE SODIUM 1 TABLET: 8.6; 5 TABLET ORAL at 20:42

## 2017-04-12 NOTE — PLAN OF CARE
Problem: Goal Outcome Summary  Goal: Goal Outcome Summary  Outcome: Improving  Pt is meeting expected goals, able to ambulate and empty bladder independently. Pt has went to visit infant in NICU and is utilizing electronic breast pump.  Pt has denied pain. Pt education done, see flow sheet. Expected d/c 4/13/17.

## 2017-04-12 NOTE — LACTATION NOTE
LC to see patient.  She has been pumping for her baby in NICU.  LC reviewed pump set up and settings as well as pumping frequency.  No questions noted at this time.

## 2017-04-12 NOTE — L&D DELIVERY NOTE
Delivery Summary    Yudelka Clarke MRN# [id number]   Age: 31 year old YOB: 1985     ASSESSMENT & PLAN: 31 year old female   Obstetric History       T0      TAB0   SAB0   E0   M0   L1     at 30w0d admitted 4/10 with  labor; cervix was 3/90/-2.    Patient received betamethasone on admission at 3 am 4/10 and ; was started on MgSO4 for neuroprotection and tocolysis with good effect.    Patient with onset of painful contractions this PM at approximately 9 pm, cervix at that time was noted to be 8 cm; no bulging membranes were noted at that time.,    She rapidly progressed to complete dilation and proceeded to deliver a viable male infant over an intact perineum.   NICU staff were present.    Delayed cord clamp was done, baby then taken to warmer.   Slight foul odor was noted from amniotic fluid; placenta was delivered intact.   Cultures collected; will sent placenta to path.   Right labial laceration repaired with 4.p0 vicryl.   Mother and baby stable; baby to NICU due to prematurity.           Labor Event Times    Labor onset date:  17 Onset time:   9:45 PM   Dilation complete date:  17 Complete time:  10:10 PM            Labor Length    1st Stage (hrs):  0 (min):  25   2nd Stage (hrs):  0 (min):  2   3rd Stage (hrs):  0 (min):  6      Labor Events     labor?:  Yes    steroids:  Full Course   Labor Type:  Spontaneous   Predominate monitoring during 1st stage:  continuous electronic fetal monitoring      Antibiotics received during labor?:  No      Rupture date/time:     Rupture type:  Spontaneous rupture of membranes occuring during spontaneous labor or augmentation   Fluid color:  Clear   Fluid odor:  Foul      1:1 continuous labor support provided by?:  RN          Delivery/Placenta Date and Time    Delivery Date:  17 Delivery Time:  10:12 PM   Placenta Date/Time:  2017 10:18 PM   Oxytocin given at the time of delivery:  after delivery of  placenta      Vaginal Counts    Initial count performed by 2 team members:   Two Team Members   Dr Kodama Rozina B RN.          Needles Suture [place] Sponges Instruments   Initial counts 2  5    Added to count  1     Final counts 2 1 5       Placed during labor Accounted for at the end of labor   Yes    NA    NA       Final count performed by 2 team members:   Two Team Members   Dr Kodama Rozina B         Final count correct?:  Yes         Apgars    Living status:  Yes    1 Minute 5 Minute 10 Minute 15 Minute 20 Minute   Skin color: 1  1       Heart rate: 2  2       Reflex irritability: 1  2       Muscle tone: 1  1       Respiratory effort: 2  2       Total: 7  8          Apgars assigned by:  TATUM WYATT CNP      Cord     Complications:  None   Cord Blood Disposition:  Lab Gases Sent?:  No          Resuscitation    Methods:  Suctioning, Oxygen, NCPAP, Oximetry, Temp Skin Probe, Aris Puff      [place] Care at Delivery:  NNP asked to attend this  by Sharon Pineda MD for 30 week gestation infant with history of PTL.  When I arrived infant had precipitously delivered and was on the radiant warmer with NCPAP+6cm in place in 40% oxygen.  Oximeter was on, but was not picking up.  Infant was pinking up, fair air entry, moderate subcostal retractions. Brief PPV 25/6 rate 40. Saturations then in the mid 80's to low 90's.  Infant was placed on CLAUDE cannula for transport.  However, poor air entry on CLAUDE cannula and decreasing saturations infant was placed back on mask CPAP and oxygen was weaned to 30% prior to transport.  Mom was allowed to hold him before leaving for the NICU.  FOB accompanied infant to the NICU.  APGARS 8 9 at 1 and 5 minutes respectively.  TATUM Wyatt CNP  17 @ 2305      [place] Measurements    Weight:  55.7 oz    Head circumference:  0.28 m       Labor Events and Shoulder Dystocia    Fetal Tracing Prior to Delivery:  Category 1   Shoulder dystocia present?:  Neg             Delivery (Maternal) (Provider to Complete) (239825)    Episiotomy:  None   Perineal lacerations:  None    Labial laceration:  right Repaired?:  Yes   Vaginal laceration?:  No    Cervical laceration?:  No          Mother's Information  Mother: Yudelka Clarke #[id number]    Start of Mother's Information     IO Blood Loss  04/11/17 2145 - 04/11/17 2309    Mom's I/O Activity            End of Mother's Information  Mother: Yudelka Clarke R #[id number]            Delivery - Provider to Complete ([id number])    Delivering clinician:  STEFFEN PINEDA   Delivery Type (Choose the 1 that will go to the Birth History):  Vaginal, Spontaneous Delivery                     Other personnel:   Provider Role   RADHA MUNIZ             Placenta    Delayed Cord Clamping:  Done   Date/Time:  4/11/2017 10:18 PM   Removal:  Spontaneous   Disposition:  Pathology      Anesthesia    Method:  None         Presentation and Position    Presentation:  Vertex   Position:  Middle Occiput Anterior                    Steffen Pineda MD

## 2017-04-12 NOTE — PLAN OF CARE
Problem: Goal Outcome Summary  Goal: Goal Outcome Summary  Data: Yudelka Clarke transferred to [facility] via wheelchair at 0130.   Action: Receiving unit notified of transfer: Yes. Patient and family notified of room change. Report given to Tanja at 0030. Belongings sent to receiving unit. Accompanied by Registered Nurse. Oriented patient to surroundings. Call light within reach.   Response: Patient tolerated transfer and is stable.

## 2017-04-12 NOTE — PROVIDER NOTIFICATION
17 2200   Provider Notification   Provider Name/Title Dr Cross   Method of Notification At Bedside   Paged  Dr Cross OB in house and  team.

## 2017-04-12 NOTE — PROVIDER NOTIFICATION
17 2201   Provider Notification   Provider Name/Title Dr Pineda and  team.   Method of Notification At Bedside   NICU RN at BS.

## 2017-04-12 NOTE — PROVIDER NOTIFICATION
04/11/17 2142   Provider Notification   Provider Name/Title DR Pineda   Method of Notification Phone   Request Evaluate - Remote   Notification Reason Uterine Activity;Other (Comment)   Paged MD updated re pt is uncomfortable, having Conts mild to Moderate and pt breathing thru.MD gave order to do SVE.

## 2017-04-12 NOTE — PLAN OF CARE
0045 Up to BR with out any symptoms voided spontaneously, yadi care done by pt.Now pt is visiting her son in NICU via wheel chair.

## 2017-04-12 NOTE — PLAN OF CARE
Problem: Goal Outcome Summary  Goal: Goal Outcome Summary  Outcome: Improving  Meeting goals for shift, see flow sheet. Caring for self in room, pumping. Spouse present and supportive. Encouraged pumping every 3 hours and hand expression after pumping. Denies pain. Has been to NICU to be with baby who mother reports is improving. Anticipate D/C tomorrow.

## 2017-04-12 NOTE — PROGRESS NOTES
[facility] Obstetrics Post-Partum Progress Note          Assessment and Plan:    Assessment:   Post-partum day #1  Normal spontaneous vaginal delivery  L&D complications: Prematurity      Doing well.      Plan:   Anticipate discharge tomorrow           Interval History:   Doing well.  Pain is well-controlled.  No fevers.  No history of foul-smelling vaginal discharge.  Good appetite.  Denies chest pain, shortness of breath, nausea or vomiting.  Vaginal bleeding is similar to a heavy menstrual flow.  Ambulatory.  Breastfeeding well.          Significant Problems:    None          Review of Systems:    The patient denies any chest pain, shortness of breath, excessive pain, fever, chills, purulent drainage from the wound, nausea or vomiting.          Medications:   All medications related to the patient's surgery have been reviewed          Physical Exam:     All vitals stable  Patient Vitals for the past 12 hrs:   BP Temp Temp src Heart Rate Resp   04/12/17 1253 104/59 98.1  F (36.7  C) Oral 67 18   04/12/17 0944 109/60 98.1  F (36.7  C) Oral 73 18   04/12/17 0400 100/55 99.1  F (37.3  C) Oral 83 16     Uterine fundus is firm, non-tender and at the level of the umbilicus          Data:     All laboratory data related to this surgery reviewed  Hemoglobin   Date Value Ref Range Status   04/10/2017 11.2 (L) 11.7 - 15.7 g/dL Final   03/31/2017 11.4 (L) 11.7 - 15.7 g/dL Final     Comment:     Results confirmed by repeat test   11/16/2016 12.2 11.7 - 15.7 g/dL Final   12/30/2015 13.1 11.7 - 15.7 g/dL Final     No imaging studies have been ordered    Farzad Lynn MD, MD

## 2017-04-13 VITALS
TEMPERATURE: 97.8 F | DIASTOLIC BLOOD PRESSURE: 73 MMHG | SYSTOLIC BLOOD PRESSURE: 136 MMHG | BODY MASS INDEX: 43.47 KG/M2 | OXYGEN SATURATION: 95 % | WEIGHT: 277 LBS | RESPIRATION RATE: 18 BRPM | HEIGHT: 67 IN

## 2017-04-13 LAB — COPATH REPORT: NORMAL

## 2017-04-13 PROCEDURE — 25000132 ZZH RX MED GY IP 250 OP 250 PS 637: Performed by: OBSTETRICS & GYNECOLOGY

## 2017-04-13 RX ORDER — AMOXICILLIN 250 MG
1-2 CAPSULE ORAL 2 TIMES DAILY
Qty: 100 TABLET | COMMUNITY
Start: 2017-04-13 | End: 2017-05-26

## 2017-04-13 RX ORDER — IBUPROFEN 200 MG
600 TABLET ORAL EVERY 6 HOURS PRN
COMMUNITY
Start: 2017-04-13 | End: 2017-05-26

## 2017-04-13 RX ORDER — ACETAMINOPHEN 325 MG/1
650 TABLET ORAL EVERY 4 HOURS PRN
Qty: 100 TABLET | Refills: 0 | COMMUNITY
Start: 2017-04-13

## 2017-04-13 RX ADMIN — PRENATAL VIT W/ FE FUMARATE-FA TAB 27-0.8 MG 1 TABLET: 27-0.8 TAB at 08:45

## 2017-04-13 RX ADMIN — SENNOSIDES AND DOCUSATE SODIUM 1 TABLET: 8.6; 5 TABLET ORAL at 08:45

## 2017-04-13 NOTE — PLAN OF CARE
Problem: Goal Outcome Summary  Goal: Goal Outcome Summary  Outcome: Adequate for Discharge Date Met:  04/13/17  Meeting goals for shift and discharge to home, see flow sheet. Caring for self in room, spouse present and supportive. Pumping, and going to NICU to care and be with infant. Discharge later today. Denies pain.

## 2017-04-13 NOTE — DISCHARGE INSTRUCTIONS
Postpartum Vaginal Delivery Instructions  [facility] [phone]  Lactation Dept [phone]    Activity       Ask family and friends for help when you need it.    Do not place anything in your vagina for 6 weeks.    You are not restricted on other activities, but take it easy for a few weeks to allow your body to recover from delivery.  You are able to do any activities you feel up to that point.    No driving until you have stopped taking your pain medications (usually two weeks after delivery).     Call your health care provider if you have any of these symptoms:       Increased pain, swelling, redness, or fluid around your stiches from an episiotomy or perineal tear.    A fever above 100.4 F (38 C) with or without chills when placing a thermometer under your tongue.    You soak a sanitary pad with blood within 1 hour, or you see blood clots larger than a golf ball.    Bleeding that lasts more than 6 weeks.    Vaginal discharge that smells bad.    Severe pain, cramping or tenderness in your lower belly area.    A need to urinate more frequently (use the toilet more often), more urgently (use the toilet very quickly), or it burns when you urinate.    Nausea and vomiting.    Redness, swelling or pain around a vein in your leg.    Problems breastfeeding or a red or painful area on your breast.    Chest pain and cough or are gasping for air.    Problems coping with sadness, anxiety, or depression.  If you have any concerns about hurting yourself or the baby, call your provider immediately.     You have questions or concerns after you return home.     Keep your hands clean:  Always wash your hands before touching your perineal area and stitches.  This helps reduce your risk of infection.  If your hands aren't dirty, you may use an alcohol hand-rub to clean your hands. Keep your nails clean and short.

## 2017-04-13 NOTE — DISCHARGE SUMMARY
[facility]    Post-Partum Progress Note    Assessment & Plan   Assessment:  Post-partum day #2  Normal spontaneous vaginal delivery,     Doing well. Baby doing well in NICU.  No excessive bleeding  Pain well-controlled.    Plan:  Ambulation encouraged  Pain control measures for home discussed.  Follow up in 6 weeks for PP visit.  Discussed briefly options for monitoring/prevention of PTL in future pregnancies.    Pao Fleming MD    Interval History   Doing well.  Pain is well-controlled.  No fevers.  No history of foul-smelling vaginal discharge.  Good appetite.  Denies chest pain, shortness of breath, nausea or vomiting.  Vaginal bleeding is similar to a heavy menstrual flow.  Ambulatory. Baby is doing well in the NICU.    Medications     NO Rho (D) immune globulin (RhoGam) needed - mother Rh POSITIVE       oxytocin in 0.9% NaCl       - MEDICATION INSTRUCTIONS -       oxytocin in 0.9% NaCl         prenatal multivitamin  plus iron  1 tablet Oral Daily     senna-docusate  1-2 tablet Oral BID       Physical Exam   Temp: 97.8  F (36.6  C) Temp src: Oral BP: 136/73   Heart Rate: 77 Resp: 18        Vitals:    04/10/17 0207   Weight: 125.6 kg (277 lb)     Vital Signs with Ranges  Temp:  [97.8  F (36.6  C)-99.1  F (37.3  C)] 97.8  F (36.6  C)  Heart Rate:  [67-77] 77  Resp:  [18] 18  BP: (104-136)/(59-73) 136/73     General: mood is normal, affect is bright.  Uterine fundus is firm, non-tender and at the level of the umbilicus  Extremities Non-tender    Data   Recent Labs   Lab Test  04/10/17   0300   ABO  A   RH   Pos   AS  Neg     Recent Labs   Lab Test  04/10/17   03017   1152   HGB  11.2*  11.4*     Recent Labs   Lab Test  16   1444   RUQIGG  16

## 2017-04-13 NOTE — PLAN OF CARE
Problem: Goal Outcome Summary  Goal: Goal Outcome Summary  Outcome: Improving  Patient doing well. Independently going to NICU to see  and pumping. In NICU most of shift. Denied pain.

## 2017-04-13 NOTE — LACTATION NOTE
Lactation to see patient.   down in NICU.  Mother pumping, bringing EBM down to .  Reviewed preemie and standard pumping and to move to standard when she had pumped 20 ml 3 x in a row.  Encouraged to call lactation PRN at discharge.

## 2017-04-13 NOTE — PLAN OF CARE
Problem: Goal Outcome Summary  Goal: Goal Outcome Summary  Outcome: Improving  Pt able to get some rest between cares during the night.  Pumping and getting around 5 cc each session.  Walks down to NICU independently, but did not go down this shift, so she can sleep.  Independent with cares.  Anticipate d/c later today.

## 2017-04-13 NOTE — PLAN OF CARE
Problem: Discharge Planning  Goal: Discharge Planning (Adult, OB, Behavioral, Peds)  Outcome: Adequate for Discharge Date Met:  04/13/17  AVS/DC teaching and medications reviewed with patient. Patient is aware of when to call and follow-up. Patient is aware of resources available.  Aware of [place] scale and when to retake and call.Teaching is completed. Bed and board information provided and patient will do later. Home care for first time breast feeding mother, infant in NICU. Ready for discharge to home when spouse arrives.

## 2017-04-14 ENCOUNTER — LACTATION ENCOUNTER (OUTPATIENT)
Age: 32
End: 2017-04-14

## 2017-04-14 ENCOUNTER — MEDICAL CORRESPONDENCE (OUTPATIENT)
Dept: HEALTH INFORMATION MANAGEMENT | Facility: CLINIC | Age: 32
End: 2017-04-14

## 2017-04-14 LAB
BACTERIA SPEC CULT: NO GROWTH
BACTERIA SPEC CULT: NORMAL
MICRO REPORT STATUS: NORMAL
MICRO REPORT STATUS: NORMAL
SPECIMEN SOURCE: NORMAL
SPECIMEN SOURCE: NORMAL

## 2017-04-14 NOTE — LACTATION NOTE
This note was copied from a baby's chart.  Spoke with Yudelka as she was d/c to home. We reviewed pumping strategies. I encouraged sleep time at night 4-41/2 hour and ~3 hours otherwise until her milk supply is well established. Schedule modifications will occur then. She has a medical grade pump they are renting as insurance denied claim. Gave information sheets on SHANE and script for father to call again. I also informed of other options(Babies R US) and we discussed need for medical grade and monitoring her supply. Will continue to follow and support.

## 2017-04-15 LAB
BACTERIA SPEC CULT: ABNORMAL
MICRO REPORT STATUS: ABNORMAL
MICROORGANISM SPEC CULT: ABNORMAL
SPECIMEN SOURCE: ABNORMAL

## 2017-04-17 ENCOUNTER — LACTATION ENCOUNTER (OUTPATIENT)
Age: 32
End: 2017-04-17

## 2017-04-17 NOTE — LACTATION NOTE
"This note was copied from a baby's chart.  Spoke with Yudelka at bedside. Reviewed pumping strategies. Using hospital grade pump at home. Milk volume is adequate. STS time with \"Buzz\" well tolerated. Will continue to follow and support.  "

## 2017-04-18 ENCOUNTER — HOSPITAL ENCOUNTER (OUTPATIENT)
Dept: LAB | Facility: CLINIC | Age: 32
Discharge: HOME | End: 2017-04-18
Attending: OBSTETRICS & GYNECOLOGY | Admitting: OBSTETRICS & GYNECOLOGY
Payer: COMMERCIAL

## 2017-04-18 DIAGNOSIS — Z20.820 VARICELLA EXPOSURE: Primary | ICD-10-CM

## 2017-04-18 DIAGNOSIS — Z20.820 VARICELLA EXPOSURE: ICD-10-CM

## 2017-04-18 PROCEDURE — 86787 VARICELLA-ZOSTER ANTIBODY: CPT | Performed by: OBSTETRICS & GYNECOLOGY

## 2017-04-18 PROCEDURE — 36415 COLL VENOUS BLD VENIPUNCTURE: CPT | Performed by: OBSTETRICS & GYNECOLOGY

## 2017-04-19 ENCOUNTER — LACTATION ENCOUNTER (OUTPATIENT)
Age: 32
End: 2017-04-19

## 2017-04-19 NOTE — LACTATION NOTE
This note was copied from a baby's chart.  Spoke with Yudelka at bedside. She is using a self rental hospital grade pump at home. Her supply is appropriate. And she reports no issues at this time with pumping.  Insurance has denied rental coverage for a hospital grade electric pump. Appeal needed by family. I will compose a letter as below for them to submit with their appeal.Will continue to follow and support.  2017  To Whom It May Concern,  Demetrio Clarke is a patient in our  Intensive Care Unit. He was born at 30 weeks gestation, 10 weeks early. As a result his nutrition is our upmost concern. Evidence has proven that human milk is the standard for best nutrition. Providing essential nutrients that cannot be replicated. In order to provide her milk for Demetrio, this mother Rashad, must pump her breasts to provide that nutrition. This process occurs 8 times each day in order to maintain a milk volume to support Demetrio until he is strong and mature enough to breast feed efficiently. That usually does not occur until he is at term age, 40 weeks.  Given that information, evidence has shown a medical grade breast pump is essential for these mothers to maintain their milk volume without the stimulation from their baby at breast. I am asking that you provide this mother with the appropriate tools to best support her baby in his growth and development. As a Lactation Consultant,  I am counseling this mother and the length of time she will be using a breast pump warrants her using the medical grade pump.   Please reconsider and provide coverage for the medical grade pump.    Sincerely,  Laura LUKEN, RN, IBCLC

## 2017-04-20 LAB
VZV IGG SER QL IA: 2.2 AI (ref 0–0.8)
VZV IGM SER IA-ACNC: 0.15

## 2017-04-21 ENCOUNTER — LACTATION ENCOUNTER (OUTPATIENT)
Age: 32
End: 2017-04-21

## 2017-04-21 NOTE — LACTATION NOTE
This note was copied from a baby's chart.  Spoke with Yudelka before she entered Isolation room. She reports no issues with pumping. Milk volume is appropriate. FOB contacted yann. Yudelka had chicken pox and and her titer shows immunity. Will continue to follow and support.

## 2017-05-05 ENCOUNTER — LACTATION ENCOUNTER (OUTPATIENT)
Age: 32
End: 2017-05-05

## 2017-05-05 NOTE — LACTATION NOTE
"This note was copied from a baby's chart.  Spoke with Yudelka at bedside holding Demetrio JONES. She reports no issues with pumping and milk volume. During STS Demetrio has been able to \"practice\" at a pumped breast. Will continue to follow and support.   "

## 2017-05-08 ENCOUNTER — LACTATION ENCOUNTER (OUTPATIENT)
Age: 32
End: 2017-05-08

## 2017-05-08 NOTE — LACTATION NOTE
This note was copied from a baby's chart.  Spoke with parents at Demetrio's bedside. Yudelka's mother is coming today so per request we will delay putting Demetrio to breast with a nipple shield. Yudelka reports her pumping routine is unchanged and she is producing ~900 mL/day. Will continue to follow and support.

## 2017-05-10 ENCOUNTER — LACTATION ENCOUNTER (OUTPATIENT)
Age: 32
End: 2017-05-10

## 2017-05-10 NOTE — LACTATION NOTE
This note was copied from a baby's chart.  Per bedside RN reports times at breast have been successful with nipple shield. Mother not here at this time. Will continue to follow and support.

## 2017-05-12 ENCOUNTER — LACTATION ENCOUNTER (OUTPATIENT)
Age: 32
End: 2017-05-12

## 2017-05-12 NOTE — LACTATION NOTE
This note was copied from a baby's chart.  Spoke with Yudelka at bedside of Demetrio. She reports no issues with pumping, milk volume is abundant. She is breast feeding x2/day with nipple shield. Encouraged increasing frequency to 3x/day on Monday.  Will continue to follow and support.

## 2017-05-15 ENCOUNTER — LACTATION ENCOUNTER (OUTPATIENT)
Age: 32
End: 2017-05-15

## 2017-05-15 NOTE — LACTATION NOTE
This note was copied from a baby's chart.  Demetrio is at breast with 24mm nipple shield. Yudelka has pre pumped to let down. Monitoring bottom lip, sucking bursts are not vigorous. Sleepy. 2mL per scale. Encouraged continue breast feeding attempts using 24mm nipple shield and pre pumping to let down. Reviewed pumping strategies. Will continue to follow and support.

## 2017-05-15 NOTE — LACTATION NOTE
This note was copied from a baby's chart.  Demetrio is awake and showing cues. Hiccups started and even with small drops of milk did not resolve. Did eventually resolve and latched but was sleepy and no longer actively showing cues.

## 2017-05-17 ENCOUNTER — LACTATION ENCOUNTER (OUTPATIENT)
Age: 32
End: 2017-05-17

## 2017-05-17 NOTE — LACTATION NOTE
This note was copied from a baby's chart.  Demetrio is at breast when I entered. Using football hold with nipple shield his sucking bursts are 2-4 and swallow is not audible until after the swallow and before he resumes sucking. WOB is increased in recovery periods.  Yudelka reports he was feeding a few minutes before I entered and was doing more sucking. She reports bottles were introduced yesterday. I encouraged continued breasta feeding experiences when she is here and he is showing cues to feed. Willl continue to follow and support.

## 2017-05-22 ENCOUNTER — LACTATION ENCOUNTER (OUTPATIENT)
Age: 32
End: 2017-05-22

## 2017-05-22 NOTE — LACTATION NOTE
This note was copied from a baby's chart.  Assisting with breast feeding. Demetrio is alert and showing readiness for feeding. Latches easily with 20mm nipple shield in football hold. Yudelka pumped 2 hours previously. Rhythmic pattern with 1:1 suck/swallow and appropriate recovery respirations. Fatigue ended the feeding. 18mL per scale. Yudelka reports pumping routine is manageable and has no concerns at this time. Will continue to follow and support.

## 2017-05-22 NOTE — LACTATION NOTE
This note was copied from a baby's chart.  Observing breast feeding. This is second in a row at breast. He is alert, latches easily with 20mm nipple shield. Rhythmic suck swallow with let down and accommodating the increase flow. 16mL per scale.   Goals to work toward: feed on right breast                                      Advance to 24mm nipple shield                                      Monitor side lying position for ear, shoulder, hip alignment  Will continue to follow and support.

## 2017-05-26 ENCOUNTER — LACTATION ENCOUNTER (OUTPATIENT)
Age: 32
End: 2017-05-26

## 2017-05-26 ENCOUNTER — PRENATAL OFFICE VISIT (OUTPATIENT)
Dept: OBGYN | Facility: CLINIC | Age: 32
End: 2017-05-26
Payer: COMMERCIAL

## 2017-05-26 VITALS
DIASTOLIC BLOOD PRESSURE: 82 MMHG | TEMPERATURE: 98 F | WEIGHT: 285.6 LBS | BODY MASS INDEX: 44.83 KG/M2 | HEART RATE: 71 BPM | SYSTOLIC BLOOD PRESSURE: 125 MMHG | HEIGHT: 67 IN

## 2017-05-26 DIAGNOSIS — Z13.220 SCREENING CHOLESTEROL LEVEL: Primary | ICD-10-CM

## 2017-05-26 PROCEDURE — 99207 ZZC POST PARTUM EXAM: CPT | Performed by: OBSTETRICS & GYNECOLOGY

## 2017-05-26 PROCEDURE — 80061 LIPID PANEL: CPT | Performed by: OBSTETRICS & GYNECOLOGY

## 2017-05-26 PROCEDURE — 36415 COLL VENOUS BLD VENIPUNCTURE: CPT | Performed by: OBSTETRICS & GYNECOLOGY

## 2017-05-26 PROCEDURE — 82947 ASSAY GLUCOSE BLOOD QUANT: CPT | Performed by: OBSTETRICS & GYNECOLOGY

## 2017-05-26 NOTE — PROGRESS NOTES
"SUBJECTIVE:  Yudelka Clarke,  is here for a postpartum visit.  She had a   on delivering a  baby boy, named Mervin, at 30 weeks. He is still in the NICU, but should be able to come home soon. He is breastfeeding well.    Reports good mood and no concerns. NO intercourse yet. Plans condoms.    Last PHQ-9 score on record= No flowsheet data found.  No flowsheet data found.    Delivery complications:  Yes,  as above.  Breast feeding:  Yes  Bladder problems:  No  Bowel problems/hemorrhoids:  No  Episiotomy/laceration/incision healed? Yes  Vaginal flow:  None  [place]:  No  Contraception: condoms  Emotional adjustment:  doing well and happy  Back to work:  Not yet    HPI: Feels well, happy, relieved that Mervin will be able to come home soon. Is not planning to conceive for the next 1-2 years.    12 point review of systems negative other than symptoms noted below.    OBJECTIVE:  Vitals: /82  Pulse 71  Temp 98  F (36.7  C) (Oral)  Ht 5' 7\" (1.702 m)  Wt 285 lb 9.6 oz (129.5 kg)  LMP 2016  Breastfeeding? Yes  BMI 44.73 kg/m2  BMI= Body mass index is 44.73 kg/(m^2).  General - pleasant female in no acute distress.  Abdomen - soft, nontender, nondistended, no hepatosplenomegaly.  Pelvic - EG: normal adult female, BUS: within normal limits, Vagina: well rugated, no discharge.  Rectovaginal - deferred.    ASSESSMENT:    ICD-10-CM    1. Screening cholesterol level Z13.220 Lipid Profile     Glucose   2. Routine postpartum follow-up Z39.2        PLAN:  May resume normal activities without restrictions.  Pap smear performed:  No, not due until 2019.  Last Pap date:    Result: nl   Full counseling was provided, and all questions were answered.   Return to clinic in one year for an annual visit.     Pao Fleming MD    "

## 2017-05-26 NOTE — LACTATION NOTE
This note was copied from a baby's chart.  Assisting with breast feeding. Encouraging feeding on right breast (Mom has used left more exclusively due to flow). Demetrio was slow to start, had hiccups that finally resolved and then not vigorous. After several minutes, moved to other breast. Noted that Yudelka had been using 20 mm nipple shield. Changed to 24mm. Demetrio more active on left breast for 16mL per scale. Encouraged continued use of right breast for partial feeding and finish on left breast. Encouraged use of mild breast compression when taking longer sucking bursts. Also encouraging chin support as feeding progresses and Demetrio begins to tire. Will continue to follow and support.    PLAN:STS prior to feeding             24mm nipple shield             Use right breast at least for 1 feeding/day             Use breast compressions and chin support

## 2017-05-26 NOTE — NURSING NOTE
"Chief Complaint   Patient presents with     Prenatal Care     10 weeks post partum       Initial /82  Pulse 71  Temp 98  F (36.7  C) (Oral)  Ht 5' 7\" (1.702 m)  Wt 285 lb 9.6 oz (129.5 kg)  LMP 08/25/2016  Breastfeeding? Yes  BMI 44.73 kg/m2 Estimated body mass index is 44.73 kg/(m^2) as calculated from the following:    Height as of this encounter: 5' 7\" (1.702 m).    Weight as of this encounter: 285 lb 9.6 oz (129.5 kg).  Medication Reconciliation: complete      ELENA Velazco  "

## 2017-05-27 LAB
CHOLEST SERPL-MCNC: 212 MG/DL
GLUCOSE SERPL-MCNC: 82 MG/DL (ref 70–99)
HDLC SERPL-MCNC: 75 MG/DL
LDLC SERPL CALC-MCNC: 118 MG/DL
NONHDLC SERPL-MCNC: 137 MG/DL
TRIGL SERPL-MCNC: 93 MG/DL

## 2017-05-31 ENCOUNTER — LACTATION ENCOUNTER (OUTPATIENT)
Age: 32
End: 2017-05-31

## 2017-05-31 ENCOUNTER — TELEPHONE (OUTPATIENT)
Dept: OBGYN | Facility: CLINIC | Age: 32
End: 2017-05-31

## 2017-05-31 NOTE — LACTATION NOTE
This note was copied from a baby's chart.  Talked to Yudelka at home by phone. She reports no concerns about pumping. Her milk volume is abundant. Demetrio's po% will result in following the  infant driven feeding plan on Friday. I will continue to follow and support.

## 2018-10-26 ENCOUNTER — OFFICE VISIT (OUTPATIENT)
Dept: OBGYN | Facility: CLINIC | Age: 33
End: 2018-10-26
Payer: COMMERCIAL

## 2018-10-26 VITALS
HEART RATE: 78 BPM | BODY MASS INDEX: 44.73 KG/M2 | DIASTOLIC BLOOD PRESSURE: 84 MMHG | WEIGHT: 285 LBS | SYSTOLIC BLOOD PRESSURE: 131 MMHG | HEIGHT: 67 IN

## 2018-10-26 DIAGNOSIS — Z00.00 ANNUAL PHYSICAL EXAM: Primary | ICD-10-CM

## 2018-10-26 DIAGNOSIS — E28.2 PCOS (POLYCYSTIC OVARIAN SYNDROME): ICD-10-CM

## 2018-10-26 PROCEDURE — 99395 PREV VISIT EST AGE 18-39: CPT | Performed by: OBSTETRICS & GYNECOLOGY

## 2018-10-26 NOTE — MR AVS SNAPSHOT
After Visit Summary   10/26/2018    Yudelka Clarke    MRN: [id number]           Patient Information     Date Of Birth          1985        Visit Information        Provider Department      10/26/2018 2:30 PM Pao Fleming MD [facility]        Today's Diagnoses     Annual physical exam    -  1    PCOS (polycystic ovarian syndrome)           Follow-ups after your visit        Your next 10 appointments already scheduled     Oct 30, 2018  9:00 AM CDT   LAB with CR LAB   [facility] ([facility])    [address] [place] [id number] [phone]           Please do not eat 10-12 hours before your appointment if you are coming in fasting for labs on lipids, cholesterol, or glucose (sugar). This does not apply to pregnant women. Water, hot tea and black coffee (with nothing added) are okay. Do not drink other fluids, diet soda or chew gum.              Future tests that were ordered for you today     Open Future Orders        Priority Expected Expires Ordered    Lipid Profile Routine  4/26/2019 10/26/2018    Testosterone Free and Total Routine  2/3/2019 10/26/2018    TSH with free T4 reflex Routine  4/26/2019 10/26/2018    Insulin level Routine  2/3/2019 10/26/2018    Hemoglobin A1c Routine  2/3/2019 10/26/2018    Glucose Routine  4/26/2019 10/26/2018            Who to contact     If you have questions or need follow up information about today's clinic visit or your schedule please contact [facility] directly at [phone].  Normal or non-critical lab and imaging results will be communicated to you by MyChart, letter or phone within 4 business days after the clinic has received the results. If you do not hear from us within 7 days, please contact the clinic through MyChart or phone. If you have a critical or abnormal lab result, we will notify you by phone as soon as possible.  Submit refill requests  "through Eyeota or call your pharmacy and they will forward the refill request to us. Please allow 3 business days for your refill to be completed.          Additional Information About Your Visit        [employer] Information     Eyeota gives you secure access to your electronic health record. If you see a primary care provider, you can also send messages to your care team and make appointments. If you have questions, please call your primary care clinic.  If you do not have a primary care provider, please call [phone] and they will assist you.        Care EveryWhere ID     This is your Care EveryWhere ID. This could be used by other organizations to access your [place] medical records  [id number]        Your Vitals Were     Pulse Height Last Period Breastfeeding? BMI (Body Mass Index)       78 5' 7\" (1.702 m) 10/07/2018 (Exact Date) No 44.64 kg/m2        Blood Pressure from Last 3 Encounters:   10/26/18 131/84   05/26/17 125/82   04/13/17 136/73    Weight from Last 3 Encounters:   10/26/18 285 lb (129.3 kg)   05/26/17 285 lb 9.6 oz (129.5 kg)   04/10/17 277 lb (125.6 kg)               Primary Care Provider Office Phone # Fax #    Stevo Barros [phone]       [address] [facility] [address]        Equal Access to Services     EDMUND CRUZ AH: Hadii aad ku hadasho Soomaali, waaxda luqadaha, qaybta kaalmada adeegyada, leandro king. So [facility] [phone].    ATENCIÓN: Si habla español, tiene a interiano disposición servicios gratuitos de asistencia lingüística. Llhayley al [phone].    We comply with applicable federal civil rights laws and [place] laws. We do not discriminate on the basis of race, color, national origin, age, disability, sex, sexual orientation, or gender identity.            Thank you!     Thank you for choosing [facility]  for your care. Our goal is always to provide you with excellent care. Hearing back from our " patients is one way we can continue to improve our services. Please take a few minutes to complete the written survey that you may receive in the mail after your visit with us. Thank you!             Your Updated Medication List - Protect others around you: Learn how to safely use, store and throw away your medicines at www.disposemymeds.org.          This list is accurate as of 10/26/18  3:16 PM.  Always use your most recent med list.                   Brand Name Dispense Instructions for use Diagnosis    acetaminophen 325 MG tablet    TYLENOL    100 tablet    Take 2 tablets (650 mg) by mouth every 4 hours as needed for mild pain or fever (greater than or equal to 38? C /100.4? F (oral) or 38.5? C/ 101.4? F (core).)        PRENATAL PO

## 2018-10-26 NOTE — PROGRESS NOTES
SUBJECTIVE:                                                      Yudelka is a 33 year old  female who presents for annual exam.     Patient's last menstrual period was 10/07/2018 (exact date).. Menses are regular q 4-6 weeks and normal lasting 7-10 days.  Using none for contraception.  She is currently considering pregnancy.  Besides routine health maintenance,  she would like to discuss pregnancy planning. Her last pregnancy was complicated by PTD at 30w. She conceived after starting metformin for PCOS. She has not taken it since. Wondering if she needs it again. She was using condoms until a few months ago, so hasn't been trying very long.  GYNECOLOGIC HISTORY:    Yudelka is sexually active with 1 male partner(s) and is currently in a monogamous relationship.      History sexually transmitted infections:No STD history    History of abnormal Pap smear: NO - age 30-65 PAP every 5 years with negative HPV co-testing recommended  Family history of breast CA: No  Family history of uterine/ovarian CA: No    Family history of colon CA: No      HISTORY:  Obstetric History       T0      L1     SAB0   TAB0   Ectopic0   Multiple0   Live Births1       # Outcome Date GA Lbr Jose Luis/2nd Weight Sex Delivery Anes PTL Lv   1  17 30w0d 00:25 / 00:02 3 lb 7.7 oz (1.58 kg) M Vag-Spont Local Y LISA      Name: ERAN MACEDO      Apgar1:  7                Apgar5: 8        Past Medical History:   Diagnosis Date     Blood type A+      Left forearm fracture     3 years old - cast     Right forearm fracture     4 years old - cast     Past Surgical History:   Procedure Laterality Date     HC TOOTH EXTRACTION W/FORCEP       OSTEOTOMY SAGITTAL SPLIT N/A 2016    jaw surgery for aligment of teeth     Family History   Problem Relation Age of Onset     Diabetes Maternal Grandmother      Coronary Artery Disease Maternal Grandfather      Thyroid Cancer Mother 45     Sleep Apnea Father      Diabetes Maternal  "Aunt      Social History     Social History     Marital status:      Spouse name: Jose Angel     Number of children: 0     Years of education: N/A     Social History Main Topics     Smoking status: Never Smoker     Smokeless tobacco: Never Used     Alcohol use No     Drug use: No     Sexual activity: Yes     Partners: Male     Other Topics Concern     None     Social History Narrative       Current Outpatient Prescriptions:      Prenatal Vit-Fe Fumarate-FA (PRENATAL PO), , Disp: , Rfl:      acetaminophen (TYLENOL) 325 MG tablet, Take 2 tablets (650 mg) by mouth every 4 hours as needed for mild pain or fever (greater than or equal to 38  C /100.4  F (oral) or 38.5  C/ 101.4  F (core).) (Patient not taking: Reported on 10/26/2018), Disp: 100 tablet, Rfl: 0   No Known Allergies    Past medical, surgical, social and family history were reviewed and updated in EPIC.    ROS:   12 point review of systems negative other than symptoms noted below.      OBJECTIVE:                                                      EXAM:  /[birth year]  Pulse 78  Ht 5' 7\" (1.702 m)  Wt 285 lb (129.3 kg)  LMP 10/07/2018 (Exact Date)  Breastfeeding? No  BMI 44.64 kg/m2   BMI: Body mass index is 44.64 kg/(m^2).  General: Alert and oriented, no distress.  Psychiatric: Mood and affect within normal limits.  Skin: Warm and dry, no lesions, rashes or discolorations.  Neck: Neck supple. Thyroid palpbably normal in size and without nodularity.  Cardiovascular: Regular rate and rhythm, no murmurs, rubs or gallops.   Lungs:  Clear to auscultation bilaterally, breathing is unlabored.  Breasts:  Symmetric, no skin changes.  No dominant masses bilaterally.   Lymph:  No cervical, supraclavicular, infraclavicular, axillary or inguinal lymphadenopathy palpable.   Abdomen: Soft, nontender, no hepatosplenomegaly, no rebound or guarding, no masses, no hernias.   Vulva:  No external lesions, normal female hair distribution, no inguinal adenopathy.  "   Urethra:  Midline, non-tender, well supported, no discharge  Vagina:  Well-estrogenized, no abnormal discharge, no lesions  Cervix: nontedner  Uterus:  nontender, exam limited by body habitus  Ovaries:  No masses appreciated, non-tender, mobile  Rectal Exam: deferred  Musculoskeletal: extremities normal      COUNSELING:   Reviewed preventive health counseling, as reflected in patient instructions       Regular exercise       Healthy diet/nutrition       Family planning   reports that she has never smoked. She has never used smokeless tobacco.        ASSESSMENT/PLAN:                                                      33 year old female with satisfactory annual exam  (Z00.00) Annual physical exam  (primary encounter diagnosis)  Comment:   Plan: Continue healthy diet (has lost 30 lbs so far) and exercise (walking most days). This will be excellent for her overall health but certainly can also decrease side effects of PCOS and make it more likely that she will conceive.    (E28.2) PCOS (polycystic ovarian syndrome)  Comment:   Plan: Lipid Profile, Testosterone Free and Total, TSH        with free T4 reflex, Insulin level, Hemoglobin         A1c, Glucose        Needs labs, so those orders are in. I would reserve metformin for elevated insulin levels. If also has elevated glucose levels, would plan consult with endocrine. She may need Femara to help conceive; discussed that this has a better track record for conception and live birth than metformin alone in PCOS. She may, obviously, need both. Continued weight loss is also important.    History  delivery:  Plan US cervical lengths in early pregnancy (14-16w then every 2 weeks to 24 weeks) as well as 17OHP from 16-36w. Discussed at length.      Pao Fleming MD

## 2018-10-26 NOTE — NURSING NOTE
"Chief Complaint   Patient presents with     Physical     Annual exam. HM up to date. Discuss conceiving - hx of Metformin use with last pregnancy.       Initial /84  Pulse 78  Ht 5' 7\" (1.702 m)  Wt 285 lb (129.3 kg)  LMP 10/07/2018 (Exact Date)  Breastfeeding? No  BMI 44.64 kg/m2 Estimated body mass index is 44.64 kg/(m^2) as calculated from the following:    Height as of this encounter: 5' 7\" (1.702 m).    Weight as of this encounter: 285 lb (129.3 kg).  BP completed using cuff size: large    Questioned patient about current smoking habits.  Pt. has never smoked.          The following HM Due: NONE      The following patient reported/Care Every where data was sent to:  P ABSTRACT QUALITY INITIATIVES [[id number]]  Beth Johnson LPN               "

## 2018-10-30 DIAGNOSIS — E28.2 PCOS (POLYCYSTIC OVARIAN SYNDROME): ICD-10-CM

## 2018-10-30 LAB
CHOLEST SERPL-MCNC: 170 MG/DL
GLUCOSE SERPL-MCNC: 89 MG/DL (ref 70–99)
HBA1C MFR BLD: 5.3 % (ref 0–5.6)
HDLC SERPL-MCNC: 51 MG/DL
INSULIN SERPL-ACNC: 16.1 MU/L (ref 3–25)
LDLC SERPL CALC-MCNC: 94 MG/DL
NONHDLC SERPL-MCNC: 119 MG/DL
TRIGL SERPL-MCNC: 126 MG/DL
TSH SERPL DL<=0.005 MIU/L-ACNC: 3.25 MU/L (ref 0.4–4)

## 2018-10-30 PROCEDURE — 84403 ASSAY OF TOTAL TESTOSTERONE: CPT | Performed by: OBSTETRICS & GYNECOLOGY

## 2018-10-30 PROCEDURE — 82947 ASSAY GLUCOSE BLOOD QUANT: CPT | Performed by: OBSTETRICS & GYNECOLOGY

## 2018-10-30 PROCEDURE — 84270 ASSAY OF SEX HORMONE GLOBUL: CPT | Performed by: OBSTETRICS & GYNECOLOGY

## 2018-10-30 PROCEDURE — 36415 COLL VENOUS BLD VENIPUNCTURE: CPT | Performed by: OBSTETRICS & GYNECOLOGY

## 2018-10-30 PROCEDURE — 84443 ASSAY THYROID STIM HORMONE: CPT | Performed by: OBSTETRICS & GYNECOLOGY

## 2018-10-30 PROCEDURE — 80061 LIPID PANEL: CPT | Performed by: OBSTETRICS & GYNECOLOGY

## 2018-10-30 PROCEDURE — 83525 ASSAY OF INSULIN: CPT | Performed by: OBSTETRICS & GYNECOLOGY

## 2018-10-30 PROCEDURE — 83036 HEMOGLOBIN GLYCOSYLATED A1C: CPT | Performed by: OBSTETRICS & GYNECOLOGY

## 2018-11-01 LAB
SHBG SERPL-SCNC: 19 NMOL/L (ref 30–135)
TESTOST FREE SERPL-MCNC: 0.93 NG/DL (ref 0.13–0.92)
TESTOST SERPL-MCNC: 39 NG/DL (ref 8–60)

## 2018-11-16 ENCOUNTER — OFFICE VISIT (OUTPATIENT)
Dept: OBGYN | Facility: CLINIC | Age: 33
End: 2018-11-16
Payer: COMMERCIAL

## 2018-11-16 VITALS
HEIGHT: 67 IN | WEIGHT: 289 LBS | SYSTOLIC BLOOD PRESSURE: 141 MMHG | DIASTOLIC BLOOD PRESSURE: 86 MMHG | HEART RATE: 74 BPM | BODY MASS INDEX: 45.36 KG/M2

## 2018-11-16 DIAGNOSIS — E28.2 PCOS (POLYCYSTIC OVARIAN SYNDROME): Primary | ICD-10-CM

## 2018-11-16 DIAGNOSIS — N97.0 OLIGO-OVULATION: ICD-10-CM

## 2018-11-16 PROCEDURE — 99214 OFFICE O/P EST MOD 30 MIN: CPT | Performed by: OBSTETRICS & GYNECOLOGY

## 2018-11-16 RX ORDER — LETROZOLE 2.5 MG/1
TABLET, FILM COATED ORAL
Qty: 5 TABLET | Refills: 0 | Status: SHIPPED | OUTPATIENT
Start: 2018-11-16 | End: 2019-01-29

## 2018-11-16 NOTE — NURSING NOTE
"Chief Complaint   Patient presents with     RECHECK     Discuss starting Clomid.        Initial /86  Pulse 74  Ht 5' 7\" (1.702 m)  Wt 289 lb (131.1 kg)  LMP 10/07/2018 (Exact Date)  Breastfeeding? No  BMI 45.26 kg/m2 Estimated body mass index is 45.26 kg/(m^2) as calculated from the following:    Height as of this encounter: 5' 7\" (1.702 m).    Weight as of this encounter: 289 lb (131.1 kg).  BP completed using cuff size: large    Questioned patient about current smoking habits.  Pt. has never smoked.          Beth Johnson LPN                "

## 2018-11-16 NOTE — PATIENT INSTRUCTIONS
Using Femara to increase your chance of pregnancy:      1. Take 1 tablet a day, starting on day 3 of your cycle (day 1 is the first day of bleeding), and continue for 5 days (until day 7).  2.  Have unprotected intercourse every other day beginning on day 11.  3.  You may choose to use an over the counter ovulatory predictor kit. A positive result (which indicates ovulation is likely to occur in the next 24-36 hours) usually occurs from 5 to 12 days after the last day of clomid.   4.   Please check a pregnancy test if you do not have a menses by day 35 of your cycle; let us know if you have a positive result so that we may help you with your prenatal care.      5.  If you have a negative pregnancy test, contact the clinic for instructions regarding your next cycle.    7.  If no conception after 3 ovulatory cycles, please make an appointment to return to the clinic to discuss further evaluation and treatment options, unless we have discussed other plans at your clinic visit.       Common side effects of Femara may include PMS-like symptoms, lower abdominal fullness or tenderness, and occasionally, ovarian cysts. If you experience severe pain, which is unlikely, you should contact your doctor for further advice or, after hours, seek emergency care if your pain is severe and not relieved by tylenol or ibuprofen.

## 2018-11-16 NOTE — PROGRESS NOTES
SUBJECTIVE:                                                   Yudelka Clarke is a 33 year old female who presents to clinic today to follow up for oligo-ovulation and resulting difficulty conceiving. Please see my last note for complete details.    History significant for PCOS. Of note, labs recently checked and within normal limits. She has lost 30 lb with diet and exercise, and would like to conceive. Also of note, her blood pressure is slightly high today. I recommend regular rechecks of this while attempting conception, as chronic hypertension does put her at risk for hypertensive disorders of pregnancy.      Problem list and histories reviewed & adjusted, as indicated.  Additional history: as documented.    Patient Active Problem List   Diagnosis     Mandibular hypoplasia     PCOS (polycystic ovarian syndrome)     Morbid obesity (H)     Blood type A+     Obesity in pregnancy     Encounter for triage in pregnant patient     Indication for care in labor or delivery      (spontaneous vaginal delivery)     Past Surgical History:   Procedure Laterality Date     HC TOOTH EXTRACTION W/FORCEP       OSTEOTOMY SAGITTAL SPLIT N/A 2016    jaw surgery for aligment of teeth      Social History   Substance Use Topics     Smoking status: Never Smoker     Smokeless tobacco: Never Used     Alcohol use No      Problem (# of Occurrences) Relation (Name,Age of Onset)    Coronary Artery Disease (1) Maternal Grandfather    Diabetes (2) Maternal Grandmother, Maternal Aunt    Sleep Apnea (1) Father    Thyroid Cancer (1) Mother (45)              Current Outpatient Prescriptions on File Prior to Visit:  acetaminophen (TYLENOL) 325 MG tablet Take 2 tablets (650 mg) by mouth every 4 hours as needed for mild pain or fever (greater than or equal to 38  C /100.4  F (oral) or 38.5  C/ 101.4  F (core).)   Prenatal Vit-Fe Fumarate-FA (PRENATAL PO)      No current facility-administered medications on file prior to visit.   No Known  Allergies    ROS:  5 point ROS negative except as noted above in HPI, including Gen., Resp., CV, GI &  system review.    OBJECTIVE:     No exam was necessary today as this was entirely a counseling appointment.    In-Clinic Test Results:  No results found for this or any previous visit (from the past 24 hour(s)).    ASSESSMENT/PLAN:                                                        ICD-10-CM    1. PCOS (polycystic ovarian syndrome) E28.2    2. Oligo-ovulation N97.0          Risks, benefits, and alternatives to Femara use discussed in detail, including ovarian cysts, pelvic pain, increased risks of twinning (5-9%), increased risks of twin pregnancy. She should call with any severe pain, or present to the ED if not during clinic hours. This is rare.    If she does not conceive in the first cycle, will consider increasing the dose for the second cycle, and checking a day 21 progesterone to document ovulation. Early US after 7 weeks is indicated for increased risks of twinning.      Pao Fleming MD  [facility]

## 2018-11-16 NOTE — MR AVS SNAPSHOT
After Visit Summary   11/16/2018    Yudelka Clarke    MRN: [id number]           Patient Information     Date Of Birth          1985        Visit Information        Provider Department      11/16/2018 9:00 AM Pao Fleming MD [facility]        Today's Diagnoses     PCOS (polycystic ovarian syndrome)    -  1    Oligo-ovulation          Care Instructions    Using Femara to increase your chance of pregnancy:      1. Take 1 tablet a day, starting on day 3 of your cycle (day 1 is the first day of bleeding), and continue for 5 days (until day 7).  2.  Have unprotected intercourse every other day beginning on day 11.  3.  You may choose to use an over the counter ovulatory predictor kit. A positive result (which indicates ovulation is likely to occur in the next 24-36 hours) usually occurs from 5 to 12 days after the last day of clomid.   4.   Please check a pregnancy test if you do not have a menses by day 35 of your cycle; let us know if you have a positive result so that we may help you with your prenatal care.      5.  If you have a negative pregnancy test, contact the clinic for instructions regarding your next cycle.    7.  If no conception after 3 ovulatory cycles, please make an appointment to return to the clinic to discuss further evaluation and treatment options, unless we have discussed other plans at your clinic visit.       Common side effects of Femara may include PMS-like symptoms, lower abdominal fullness or tenderness, and occasionally, ovarian cysts. If you experience severe pain, which is unlikely, you should contact your doctor for further advice or, after hours, seek emergency care if your pain is severe and not relieved by tylenol or ibuprofen.            Follow-ups after your visit        Who to contact     If you have questions or need follow up information about today's clinic visit or your schedule please contact [facility] directly  "at [phone].  Normal or non-critical lab and imaging results will be communicated to you by Chegghart, letter or phone within 4 business days after the clinic has received the results. If you do not hear from us within 7 days, please contact the clinic through Chegghart or phone. If you have a critical or abnormal lab result, we will notify you by phone as soon as possible.  Submit refill requests through [employer] or call your pharmacy and they will forward the refill request to us. Please allow 3 business days for your refill to be completed.          Additional Information About Your Visit        [employer] Information     [employer] gives you secure access to your electronic health record. If you see a primary care provider, you can also send messages to your care team and make appointments. If you have questions, please call your primary care clinic.  If you do not have a primary care provider, please call [phone] and they will assist you.        Care EveryWhere ID     This is your Care EveryWhere ID. This could be used by other organizations to access your [place] medical records  [id number]        Your Vitals Were     Pulse Height Last Period Breastfeeding? BMI (Body Mass Index)       74 5' 7\" (1.702 m) 10/07/2018 (Exact Date) No 45.26 kg/m2        Blood Pressure from Last 3 Encounters:   11/16/18 141/86   10/26/18 131/84   05/26/17 125/82    Weight from Last 3 Encounters:   11/16/18 289 lb (131.1 kg)   10/26/18 285 lb (129.3 kg)   05/26/17 285 lb 9.6 oz (129.5 kg)              Today, you had the following     No orders found for display         Today's Medication Changes          These changes are accurate as of 11/16/18  9:25 AM.  If you have any questions, ask your nurse or doctor.               Start taking these medicines.        Dose/Directions    letrozole 2.5 MG tablet   Commonly known as:  FEMARA   Used for:  Oligo-ovulation, PCOS (polycystic ovarian syndrome)   Started by:  Pao Fleming MD     "    One pill daily days 3-7 of cycle.   Quantity:  5 tablet   Refills:  0            Where to get your medicines      These medications were sent to [id number] - [place], [place] - [address]  [address], [facility] [address]     Phone:  [phone]     letrozole 2.5 MG tablet                Primary Care Provider Office Phone # Fax #    Stevo Laurent Barros [phone] [address]  [facility] [address]        Equal Access to Services     [facility] : Hadii aad ku hadasho Soomaali, waaxda luqadaha, qaybta kaalmada adeegyada, waxay idiin hayaan adeeg khcarmella sharma . So [facility] [phone].    ATENCIÓN: Si habla español, tiene a interiano disposición servicios gratuitos de asistencia lingüística. [facility] [phone].    We comply with applicable federal civil rights laws and [place] laws. We do not discriminate on the basis of race, color, national origin, age, disability, sex, sexual orientation, or gender identity.            Thank you!     Thank you for choosing [facility]  for your care. Our goal is always to provide you with excellent care. Hearing back from our patients is one way we can continue to improve our services. Please take a few minutes to complete the written survey that you may receive in the mail after your visit with us. Thank you!             Your Updated Medication List - Protect others around you: Learn how to safely use, store and throw away your medicines at www.disposemymeds.org.          This list is accurate as of 11/16/18  9:25 AM.  Always use your most recent med list.                   Brand Name Dispense Instructions for use Diagnosis    acetaminophen 325 MG tablet    TYLENOL    100 tablet    Take 2 tablets (650 mg) by mouth every 4 hours as needed for mild pain or fever (greater than or equal to 38? C /100.4? F (oral) or 38.5? C/ 101.4? F (core).)        letrozole 2.5 MG tablet    FEMARA    5 tablet    One pill  daily days 3-7 of cycle.    Oligo-ovulation, PCOS (polycystic ovarian syndrome)       PRENATAL PO

## 2019-01-28 ENCOUNTER — MYC REFILL (OUTPATIENT)
Dept: OBGYN | Facility: CLINIC | Age: 34
End: 2019-01-28

## 2019-01-28 DIAGNOSIS — E28.2 PCOS (POLYCYSTIC OVARIAN SYNDROME): ICD-10-CM

## 2019-01-28 DIAGNOSIS — N97.0 OLIGO-OVULATION: ICD-10-CM

## 2019-01-28 RX ORDER — LETROZOLE 2.5 MG/1
TABLET, FILM COATED ORAL
Qty: 5 TABLET | Refills: 0 | Status: CANCELLED | OUTPATIENT
Start: 2019-01-28

## 2019-01-29 NOTE — TELEPHONE ENCOUNTER
Pt sent a separate [employer] message with this request which was routed to Dr Fleming.       Mamie Walls RN

## 2019-02-17 ENCOUNTER — HOSPITAL ENCOUNTER (OUTPATIENT)
Dept: LAB | Facility: CLINIC | Age: 34
Discharge: HOME | End: 2019-02-17
Attending: OBSTETRICS & GYNECOLOGY | Admitting: OBSTETRICS & GYNECOLOGY
Payer: COMMERCIAL

## 2019-02-17 DIAGNOSIS — N97.0 OLIGO-OVULATION: ICD-10-CM

## 2019-02-17 DIAGNOSIS — E28.2 PCOS (POLYCYSTIC OVARIAN SYNDROME): ICD-10-CM

## 2019-02-17 LAB — PROGEST SERPL-MCNC: 10.8 NG/ML

## 2019-02-17 PROCEDURE — 84144 ASSAY OF PROGESTERONE: CPT | Performed by: OBSTETRICS & GYNECOLOGY

## 2019-02-17 PROCEDURE — 36415 COLL VENOUS BLD VENIPUNCTURE: CPT | Performed by: OBSTETRICS & GYNECOLOGY

## 2019-03-21 ENCOUNTER — OFFICE VISIT (OUTPATIENT)
Dept: OBGYN | Facility: CLINIC | Age: 34
End: 2019-03-21
Payer: COMMERCIAL

## 2019-03-21 VITALS
HEART RATE: 88 BPM | BODY MASS INDEX: 45.36 KG/M2 | HEIGHT: 67 IN | DIASTOLIC BLOOD PRESSURE: 70 MMHG | SYSTOLIC BLOOD PRESSURE: 130 MMHG | WEIGHT: 289 LBS

## 2019-03-21 DIAGNOSIS — E28.2 PCOS (POLYCYSTIC OVARIAN SYNDROME): Primary | ICD-10-CM

## 2019-03-21 DIAGNOSIS — N97.0 OLIGO-OVULATION: ICD-10-CM

## 2019-03-21 PROCEDURE — 99213 OFFICE O/P EST LOW 20 MIN: CPT | Performed by: OBSTETRICS & GYNECOLOGY

## 2019-03-21 RX ORDER — LETROZOLE 2.5 MG/1
TABLET, FILM COATED ORAL
Qty: 5 TABLET | Refills: 0 | Status: SHIPPED | OUTPATIENT
Start: 2019-03-21 | End: 2019-05-30

## 2019-03-21 ASSESSMENT — MIFFLIN-ST. JEOR: SCORE: 2043.53

## 2019-03-21 NOTE — PATIENT INSTRUCTIONS
Use ovulation kits on days 10-18 of the next cycle.  Take Letrozole as directed.    In case you do not conceive in the next cycle, you have been referred to:    [facility]    [facility] Harsha Fuentes MD    [facility] Smitha Locke MD    Tel: [phone]  Toll-free: [phone]  Fax: [phone]

## 2019-03-21 NOTE — PROGRESS NOTES
SUBJECTIVE:                                                   Yudelka Clarke is a 33 year old female who presents to clinic today to follow up for infertility/PCOS. Please see my last note for complete details. She has done 2 cycles of letrozole, the last with progesterone testing suggesting ovulation. She has still not conceived. She is here for discussion of options.      Problem list and histories reviewed & adjusted, as indicated.  Additional history: as documented.    Patient Active Problem List   Diagnosis     Mandibular hypoplasia     PCOS (polycystic ovarian syndrome)     Morbid obesity (H)     Blood type A+     Obesity in pregnancy     Encounter for triage in pregnant patient     Indication for care in labor or delivery      (spontaneous vaginal delivery)     Past Surgical History:   Procedure Laterality Date     HC TOOTH EXTRACTION W/FORCEP       OSTEOTOMY SAGITTAL SPLIT N/A 2016    jaw surgery for aligment of teeth      Social History     Tobacco Use     Smoking status: Never Smoker     Smokeless tobacco: Never Used   Substance Use Topics     Alcohol use: No     Alcohol/week: 0.0 oz      Problem (# of Occurrences) Relation (Name,Age of Onset)    Coronary Artery Disease (1) Maternal Grandfather    Diabetes (2) Maternal Grandmother, Maternal Aunt    Sleep Apnea (1) Father    Thyroid Cancer (1) Mother (45)              Current Outpatient Medications on File Prior to Visit:  Prenatal Vit-Fe Fumarate-FA (PRENATAL PO)    acetaminophen (TYLENOL) 325 MG tablet Take 2 tablets (650 mg) by mouth every 4 hours as needed for mild pain or fever (greater than or equal to 38  C /100.4  F (oral) or 38.5  C/ 101.4  F (core).) (Patient not taking: Reported on 3/21/2019)   [] progesterone (PROMETRIUM) 100 MG capsule Take 3 capsules (300 mg) by mouth daily for 10 days     No current facility-administered medications on file prior to visit.   No Known Allergies    ROS:  5 point ROS negative except as noted  above in HPI, including Gen., Resp., CV, GI &  system review.    OBJECTIVE:     No exam was necessary today as this was entirely a counseling appointment.    In-Clinic Test Results:  No results found for this or any previous visit (from the past 24 hour(s)).    ASSESSMENT/PLAN:                                                        ICD-10-CM    1. PCOS (polycystic ovarian syndrome) E28.2 letrozole (FEMARA) 2.5 MG tablet   2. Oligo-ovulation N97.0 INFERTILITY/AI REFERRAL     letrozole (FEMARA) 2.5 MG tablet         Plan is to use OPK for the next round of letrozole, discussed how to do this. We will plan referral to [facility] if no conception this next round, so numbers for scheduling that were given. Follow up with me as needed.    Pao Fleming MD  [facility]

## 2019-05-30 ENCOUNTER — PRENATAL OFFICE VISIT (OUTPATIENT)
Dept: NURSING | Facility: CLINIC | Age: 34
End: 2019-05-30
Payer: COMMERCIAL

## 2019-05-30 VITALS
HEIGHT: 67 IN | DIASTOLIC BLOOD PRESSURE: 70 MMHG | WEIGHT: 276.3 LBS | SYSTOLIC BLOOD PRESSURE: 112 MMHG | BODY MASS INDEX: 43.37 KG/M2

## 2019-05-30 DIAGNOSIS — Z34.90 SUPERVISION OF NORMAL PREGNANCY: Primary | ICD-10-CM

## 2019-05-30 LAB
ABO + RH BLD: NORMAL
ABO + RH BLD: NORMAL
BLD GP AB SCN SERPL QL: NORMAL
BLOOD BANK CMNT PATIENT-IMP: NORMAL
ERYTHROCYTE [DISTWIDTH] IN BLOOD BY AUTOMATED COUNT: 13.8 % (ref 10–15)
HCG, QUAL URINE: POSITIVE
HCT VFR BLD AUTO: 40.1 % (ref 35–47)
HGB BLD-MCNC: 13.3 G/DL (ref 11.7–15.7)
MCH RBC QN AUTO: 28.7 PG (ref 26.5–33)
MCHC RBC AUTO-ENTMCNC: 33.2 G/DL (ref 31.5–36.5)
MCV RBC AUTO: 87 FL (ref 78–100)
PLATELET # BLD AUTO: 255 10E9/L (ref 150–450)
RBC # BLD AUTO: 4.63 10E12/L (ref 3.8–5.2)
SPECIMEN EXP DATE BLD: NORMAL
WBC # BLD AUTO: 10.1 10E9/L (ref 4–11)

## 2019-05-30 PROCEDURE — 86901 BLOOD TYPING SEROLOGIC RH(D): CPT | Performed by: OBSTETRICS & GYNECOLOGY

## 2019-05-30 PROCEDURE — 84703 CHORIONIC GONADOTROPIN ASSAY: CPT

## 2019-05-30 PROCEDURE — 86900 BLOOD TYPING SEROLOGIC ABO: CPT | Performed by: OBSTETRICS & GYNECOLOGY

## 2019-05-30 PROCEDURE — 87591 N.GONORRHOEAE DNA AMP PROB: CPT | Performed by: OBSTETRICS & GYNECOLOGY

## 2019-05-30 PROCEDURE — 85027 COMPLETE CBC AUTOMATED: CPT | Performed by: OBSTETRICS & GYNECOLOGY

## 2019-05-30 PROCEDURE — 36415 COLL VENOUS BLD VENIPUNCTURE: CPT | Performed by: OBSTETRICS & GYNECOLOGY

## 2019-05-30 PROCEDURE — 87340 HEPATITIS B SURFACE AG IA: CPT | Performed by: OBSTETRICS & GYNECOLOGY

## 2019-05-30 PROCEDURE — 86850 RBC ANTIBODY SCREEN: CPT | Performed by: OBSTETRICS & GYNECOLOGY

## 2019-05-30 PROCEDURE — 87491 CHLMYD TRACH DNA AMP PROBE: CPT | Performed by: OBSTETRICS & GYNECOLOGY

## 2019-05-30 PROCEDURE — 86762 RUBELLA ANTIBODY: CPT | Performed by: OBSTETRICS & GYNECOLOGY

## 2019-05-30 PROCEDURE — 99207 ZZC NO CHARGE NURSE ONLY: CPT

## 2019-05-30 PROCEDURE — 87086 URINE CULTURE/COLONY COUNT: CPT | Performed by: OBSTETRICS & GYNECOLOGY

## 2019-05-30 PROCEDURE — 87389 HIV-1 AG W/HIV-1&-2 AB AG IA: CPT | Performed by: OBSTETRICS & GYNECOLOGY

## 2019-05-30 PROCEDURE — 86780 TREPONEMA PALLIDUM: CPT | Performed by: OBSTETRICS & GYNECOLOGY

## 2019-05-30 ASSESSMENT — MIFFLIN-ST. JEOR: SCORE: 1985.92

## 2019-05-30 NOTE — NURSING NOTE
Patient is accompanied by self. Prenatal book and folder (containing standard educational hand-outs and brochures) given to patient. Information in folder reviewed. Questions answered. Brochure given on optional screening available to assess chromosomal anomalies. Pt advised to call the clinic if she has any questions or concerns related to her pregnancy. Prenatal labs obtained. New prenatal visit scheduled on 6/21/19 with Dr. Fleming.    8w1d    Lab Results   Component Value Date    PAP NIL 06/23/2016           Patient supplied answers from flow sheet for:  Prenatal OB Questionnaire.  Past Medical History  Diabetes?: No  Hypertension : No  Heart disease, mitral valve prolapse or rheumatic fever?: No  An autoimmune disease such as lupus or rheumatoid arthritis?: No  Kidney disease or urinary tract infection?: No  Epilepsy, seizures or spells?: No  Migraine headaches?: No  A stroke or loss of function or sensation?: No  Any other neurological problems?: No  Have you ever been treated for depression?: No  Are you having problems with crying spells or loss of self-esteem?: No  Have you ever required psychiatric care?: No  Have you ever had hepatitis, liver disease or jaundice?: No  Have you been treated for blood clots in your veins, deep vein thromosis, inflammation in the veins, thrombosis, phlebitis, pulmonary embolism or varicosities?: No  Have you had excessive bleeding after surgery or dental work?: No  Do you bleed more than other women after a cut or scratch?: No  Do you have a history of anemia?: No  Have you ever had thyroid problems or taken thyroid medication?: No   Do you have any endocrine problems?: No  Have you ever been in a major accident or suffered serious trauma?: No  Within the last year, has anyone hit, slapped, kicked or otherwise hurt you?: No  In the last year, has anyone forced you to have sex when you didn't want to?: No    Past Medical History 2   Have you ever received a blood transfusion?:  No  Would you refuse a blood transfusion if a doctor judged it to be medically necessary?: No   If you answered Yes, would you rather die than receive a blood transfusion?: No  If you answered Yes, is this for Pentecostal reasons?: No  Does anyone in your home smoke?: No  Do you use tobacco products?: No  Do you drink beer, wine or hard liquor?: No  Do you use any of the following: marijuana, speed, cocaine, heroin, hallucinogens or other drugs?: No   Is your blood type Rh negative?: No  Have you ever had abnormal antibodies in your blood?: No  Have you ever had asthma?: No  Have you ever had tuberculosis?: No  Do you have any allergies to drugs or over-the-counter medications?: No  Allergies: Dust Mites, Aspartame, Ethanol, Venlafaxine, Hydrochloride, Sertraline: No  Have you had any breast problems?: No  Have you ever ?: (!) Yes  Have you had any gynecological surgical procedures such as cervical conization, a LEEP procedure, laser treatment, cryosurgery of the cervix or a dilation and curettage, etc?: No  Have you ever had any other surgical procedures?: (!) Yes(jaw 2016)  Have you been hospitalized for a nonsurgical reason excluding normal delivery?: No  Have you ever had any anesthetic complications?: No  Have you ever had an abnormal pap smear?: No    Past Medical History (Continued)  Do you have a history of abnormalities of the uterus?: No  Did your mother take SOL or any other hormones when she was pregnant with you?: No  Did it take you more than a year to become pregnant?: No  Have you ever been evaluated or treated for infertility?: (!) YES  Is there a history of medical problems in your family, which you feel may be important to this pregnancy?: (!) Yes(maternal diabetes)  Do you have any other problems we have not asked about which you feel may be important to this pregnancy?: No    Symptoms since last menstrual period  Do you have any of the following symptoms: abdominal pain, blood in stools  or urine, chest pain, shortness of breath, coughing or vomiting up blood, your heart racing or skipping beats, nausea and vomiting, pain on urination or vaginal discharge or bleed: No  Current medications, including over-the-counter medications, you are using? (If not applicable answer none): Prenatal vitamin  Will the patient be 35 years old or older at the time of delivery?: (!) Yes    Has the patient, baby's father or anyone in either family had:  Thalassemia (Italian, Greek, Mediterranean or  background only) and an MCV result less than 80?: No  Neural tube defect such as meningomyelocele, spina bifida or anencephaly?: No  Congenital heart defect?: No  Down's Syndrome?: No  Elieser-Sachs disease (Mu-ism, Cajun, Syriac-Walling)?: No  Sickle cell disease or trait ()?: No  Hemophilia or other inherited problems of blood?: No  Muscular dystrophy?: No  Cystic fibrosis?: No  [place]'s chorea?: No  Mental retardation/autism?: No  If yes, was the person tested for fragile X?: No  Any other inherited genetic or chromosomal disorder?: No  Maternal metabolic disorder (e.g Insulin-dependent diabetes, PKU)?: (!) Yes(maternal aunt, maternal gma)  A child with birth defects not listed above?: No  Recurrent pregnancy loss or stillbirth?: No   Has the patient had any medications/street drugs/alcohol since her last menstrual period?: No  Does the patient or baby's father have any other genetic risks?: No    Infection History   Do you object to being tested for Hepatitis B?: No  Do you object to being tested for HIV?: No   Do you feel that you are at high risk for coming in contact with the AIDS virus?: No  Have you ever been treated for tuberculosis?: No  Have you ever had a positive skin test for tuberculosis?: No  Do you live with someone who has tuberculosis?: No  Have you ever been exposed to tuberculosis?: No  Do you have genital herpes?: No  Does your partner have genital herpes?: No  Have you had a viral  illness since your last period?: No  Have you ever had gonorrhea, chlamydia, syphilis, venereal warts, trichomoniasis, pelvic inflammatory disease or any other sexually transmitted disease?: No  Do you know if you are a genital group B streptococcus carrier?: Unknown  Have you had chicken pox/varicella?: (!) Yes   Have you been vaccinated against chicken Pox?: No  Have you had any other infectious diseases?: No    Sindhu RAMOS RN

## 2019-05-31 LAB
BACTERIA SPEC CULT: NO GROWTH
C TRACH DNA SPEC QL NAA+PROBE: NEGATIVE
HBV SURFACE AG SERPL QL IA: NONREACTIVE
HIV 1+2 AB+HIV1 P24 AG SERPL QL IA: NONREACTIVE
N GONORRHOEA DNA SPEC QL NAA+PROBE: NEGATIVE
RUBV IGG SERPL IA-ACNC: 16 IU/ML
SPECIMEN SOURCE: NORMAL
T PALLIDUM AB SER QL: NONREACTIVE

## 2019-06-04 DIAGNOSIS — Z34.90 SUPERVISION OF NORMAL PREGNANCY: ICD-10-CM

## 2019-06-21 ENCOUNTER — PRENATAL OFFICE VISIT (OUTPATIENT)
Dept: OBGYN | Facility: CLINIC | Age: 34
End: 2019-06-21
Payer: COMMERCIAL

## 2019-06-21 VITALS
HEIGHT: 67 IN | DIASTOLIC BLOOD PRESSURE: 70 MMHG | SYSTOLIC BLOOD PRESSURE: 126 MMHG | HEART RATE: 84 BPM | BODY MASS INDEX: 43.16 KG/M2 | WEIGHT: 275 LBS

## 2019-06-21 DIAGNOSIS — O09.90 SUPERVISION OF HIGH RISK PREGNANCY, ANTEPARTUM: Primary | ICD-10-CM

## 2019-06-21 DIAGNOSIS — E66.01 MORBID OBESITY DUE TO EXCESS CALORIES (H): ICD-10-CM

## 2019-06-21 DIAGNOSIS — O09.529 ANTEPARTUM MULTIGRAVIDA OF ADVANCED MATERNAL AGE: ICD-10-CM

## 2019-06-21 DIAGNOSIS — Z12.4 PAP SMEAR FOR CERVICAL CANCER SCREENING: ICD-10-CM

## 2019-06-21 DIAGNOSIS — O09.899 HISTORY OF PRETERM DELIVERY, CURRENTLY PREGNANT: ICD-10-CM

## 2019-06-21 PROCEDURE — G0145 SCR C/V CYTO,THINLAYER,RESCR: HCPCS | Performed by: OBSTETRICS & GYNECOLOGY

## 2019-06-21 PROCEDURE — 87624 HPV HI-RISK TYP POOLED RSLT: CPT | Performed by: OBSTETRICS & GYNECOLOGY

## 2019-06-21 PROCEDURE — 99207 ZZC FIRST OB VISIT: CPT | Performed by: OBSTETRICS & GYNECOLOGY

## 2019-06-21 RX ORDER — HYDROXYPROGESTERONE CAPROATE 250 MG/ML
250 INJECTION INTRAMUSCULAR
Qty: 1 ML | Refills: 20 | Status: SHIPPED | OUTPATIENT
Start: 2019-06-21 | End: 2019-10-24 | Stop reason: DRUGHIGH

## 2019-06-21 ASSESSMENT — MIFFLIN-ST. JEOR: SCORE: 1980.02

## 2019-06-21 NOTE — NURSING NOTE
"Chief Complaint   Patient presents with     Prenatal Care       Initial /70   Pulse 84   Ht 1.702 m (5' 7\")   Wt 124.7 kg (275 lb)   LMP 2019 (Exact Date)   Breastfeeding? No   BMI 43.07 kg/m   Estimated body mass index is 43.07 kg/m  as calculated from the following:    Height as of this encounter: 1.702 m (5' 7\").    Weight as of this encounter: 124.7 kg (275 lb).  BP completed using cuff size: large    Questioned patient about current smoking habits.  Pt. has never smoked.          The following HM Due: pap smear      11w2d  - cramping or bleeding  - nausea or vomiting  - headache   + fatigue  Beth Johnson LPN                "

## 2019-06-21 NOTE — PATIENT INSTRUCTIONS
BIRTH AND 17-alpha hydroxyprogesterone caproate (17P) TREATMENT    What is  birth?      birth is the delivery of a baby before 37 weeks of gestation.  Approximately 1 in every 12 babies in [place] is born premature (that s approximately 6,000 babies every year)!     birth is often unexpected, but can happen for many reasons. There are some known risk factors, which include:   -pregnancy with twins or triplets   -being  race  -some problems with the uterus or cervix   -some medical problems like high blood pressure   -smoking, drinking, or using illegal drugs while pregnant   -infections like urinary tract infection, bacterial vaginosis and chlamydia while    pregnant  -depression, stress, and anxiety    But the biggest risk factor is having a previous  baby. In fact, women who have one  birth have a 30-50% chance of their next baby coming early too.     What are the risks to a baby that delivers prematurely?     birth is the number one cause of  death worldwide. This risk increases the earlier the baby is born.  Prematurity can also cause serious long term health problems for babies such as breathing problems, infections, bleeding into the brain, as well as long term developmental problems like cerebral palsy, learning impairments, hearing and vision problems.    How can I prevent  birth in my pregnancy?  Overall, the best thing to prevent  delivery is to stay healthy during your pregnancy, and follow up with your doctor for your regular visits and screening tests.  If you have a history of  birth in one of your past pregnancies, you may benefit from weekly injections of 17P.     What is 17P?  The full name is 17-alpha hydroxyprogesterone caproate. This is a form of your body s natural  pregnancy hormone , progesterone. The brand name of this is Ashlyn, and it is FDA approved for prevention of  birth.  This medication  is given in once weekly injections from week 16-20 through the 36th week of pregnancy. Research shows that women taking 17P can reduce their risk of  birth from 50% to 36%. The treatment has also been shown to reduce the risk of complications after birth, such as bleeding in the brain and need for supplemental oxygen.    It is important to complete the treatment once you start, as the risk of  birth goes up if you stop the injections early.    How can I get started on the Ashlyn treatment?  First, you should talk with your doctor to find out if this is a good option for you.  It is safe for pregnant women and for the fetus, but if you have some medical problems like uncontrolled blood pressure, breast cancer, or liver disease you should talk about it with your doctor first.  Your clinic will work with you to help determine insurance coverage and preauthorization if needed.  Then you will schedule weekly visits for 17P injections in addition to your routine prenatal visits with your doctor.    Side Effects of Mekena  The most common side effects  reported by [facility] users are   injection site reactions (pain [35%], swelling  [17%], pruritus (itching) [6%], nodule [5%]), urticaria (rash) (12%), pruritus (generalized itching (8%), nausea (6%), and diarrhea (2%).    BIRTH AND 17-alpha hydroxyprogesterone caproate (17P) TREATMENT    What is  birth?      birth is the delivery of a baby before 37 weeks of gestation.  Approximately 1 in every 12 babies in [place] is born premature (that s approximately 6,000 babies every year)!     birth is often unexpected, but can happen for many reasons. There are some known risk factors, which include:   -pregnancy with twins or triplets   -being  race  -some problems with the uterus or cervix   -some medical problems like high blood pressure   -smoking, drinking, or using illegal drugs while pregnant   -infections like urinary tract  infection, bacterial vaginosis and chlamydia while    pregnant  -depression, stress, and anxiety    But the biggest risk factor is having a previous  baby. In fact, women who have one  birth have a 30-50% chance of their next baby coming early too.     What are the risks to a baby that delivers prematurely?     birth is the number one cause of  death worldwide. This risk increases the earlier the baby is born.  Prematurity can also cause serious long term health problems for babies such as breathing problems, infections, bleeding into the brain, as well as long term developmental problems like cerebral palsy, learning impairments, hearing and vision problems.    How can I prevent  birth in my pregnancy?  Overall, the best thing to prevent  delivery is to stay healthy during your pregnancy, and follow up with your doctor for your regular visits and screening tests.  If you have a history of  birth in one of your past pregnancies, you may benefit from weekly injections of 17P.     What is 17P?  The full name is 17-alpha hydroxyprogesterone caproate. This is a form of your body s natural  pregnancy hormone , progesterone. The brand name of this is Ashlyn, and it is FDA approved for prevention of  birth.  This medication is given in once weekly injections from week 16-20 through the 36th week of pregnancy. Research shows that women taking 17P can reduce their risk of  birth from 50% to 36%. The treatment has also been shown to reduce the risk of complications after birth, such as bleeding in the brain and need for supplemental oxygen.    It is important to complete the treatment once you start, as the risk of  birth goes up if you stop the injections early.    How can I get started on the [place] treatment?  First, you should talk with your doctor to find out if this is a good option for you.  It is safe for pregnant women and for the fetus, but if you  have some medical problems like uncontrolled blood pressure, breast cancer, or liver disease you should talk about it with your doctor first.  Your clinic will work with you to help determine insurance coverage and preauthorization if needed.  Then you will schedule weekly visits for 17P injections in addition to your routine prenatal visits with your doctor.    Side Effects of Mekena  The most common side effects  reported by [facility] users are   injection site reactions (pain [35%], swelling  [17%], pruritus (itching) [6%], nodule [5%]), urticaria (rash) (12%), pruritus (generalized itching (8%), nausea (6%), and diarrhea (2%).

## 2019-06-21 NOTE — PROGRESS NOTES
This is a 33 year old female patient,   who presents for her first obstetrical visit. This pregnancy is Planned, Desired. OB history significant for prior spontaneous  delivery at 30 weeks.    EDC 2020 by LMP and Previous US which makes her 11w2d  today.  Her cycles are irregular and she did conceive on Femara for ovulation induction.  Her last menstrual period was normal. Since her LMP, she has experienced  nausea and emesis.  She denies abdominal pain and vaginal bleeding. Ultrasound in the 1st trimester showed EDC consistent with dates by LMP.     OB History    Para Term  AB Living   2 1 0 1 0 1   SAB TAB Ectopic Multiple Live Births   0 0 0 0 1      # Outcome Date GA Lbr Jose Luis/2nd Weight Sex Delivery Anes PTL Lv   2 Current            1  17 30w0d 00:25 / 00:02 1.58 kg (3 lb 7.7 oz) M Vag-Spont Local Y LISA      Name: ERAN MACEDO      Apgar1: 7  Apgar5: 8       History of GDM: No,  PTL : Yes - spontaneous PTD at 30 weeks. Baby did well.  History of HTN in pregnancy: No,  Thrombocytopenia: No,  Shoulder dystocia: No,  Vacuum Extraction: No  PPH: No   3rd of 4th degree laceration: No.   Other complications: No      Since her last LMP she denies use of alcohol, tobacco and street drugs.    HISTORY:  Past Medical History:   Diagnosis Date     Blood type A+      Left forearm fracture     3 years old - cast     Right forearm fracture     4 years old - cast     Past Surgical History:   Procedure Laterality Date     HC TOOTH EXTRACTION W/FORCEP       OSTEOTOMY SAGITTAL SPLIT N/A 2016    jaw surgery for aligment of teeth     Family History   Problem Relation Age of Onset     Diabetes Maternal Grandmother      Coronary Artery Disease Maternal Grandfather      Thyroid Cancer Mother 45     Sleep Apnea Father      Diabetes Maternal Aunt      Social History     Socioeconomic History     Marital status:      Spouse name: Jose Angel     Number of children: 0      Years of education: Not on file     Highest education level: Not on file   Occupational History     Not on file   Social Needs     Financial resource strain: Not on file     Food insecurity:     Worry: Not on file     Inability: Not on file     Transportation needs:     Medical: Not on file     Non-medical: Not on file   Tobacco Use     Smoking status: Never Smoker     Smokeless tobacco: Never Used   Substance and Sexual Activity     Alcohol use: No     Alcohol/week: 0.0 oz     Drug use: No     Sexual activity: Yes     Partners: Male   Lifestyle     Physical activity:     Days per week: Not on file     Minutes per session: Not on file     Stress: Not on file   Relationships     Social connections:     Talks on phone: Not on file     Gets together: Not on file     Attends Jain service: Not on file     Active member of club or organization: Not on file     Attends meetings of clubs or organizations: Not on file     Relationship status: Not on file     Intimate partner violence:     Fear of current or ex partner: Not on file     Emotionally abused: Not on file     Physically abused: Not on file     Forced sexual activity: Not on file   Other Topics Concern     Parent/sibling w/ CABG, MI or angioplasty before 65F 55M? Not Asked   Social History Narrative     Not on file     Current Outpatient Medications   Medication Sig     acetaminophen (TYLENOL) 325 MG tablet Take 2 tablets (650 mg) by mouth every 4 hours as needed for mild pain or fever (greater than or equal to 38  C /100.4  F (oral) or 38.5  C/ 101.4  F (core).)     HYDROXYprogesterone caproate (CORNELIA) 250 MG/ML injection Inject 1 mL (250 mg) into the muscle every 7 days     Prenatal Vit-Fe Fumarate-FA (PRENATAL PO)      No current facility-administered medications for this visit.      No Known Allergies    Past medical, surgical, social and family history were reviewed and updated in EPIC.    ROS:   12 point review of systems negative other than symptoms  "noted below.    EXAM:  /70   Pulse 84   Ht 1.702 m (5' 7\")   Wt 124.7 kg (275 lb)   LMP 2019 (Exact Date)   Breastfeeding? No   BMI 43.07 kg/m     BMI: Body mass index is 43.07 kg/m .    EXAM:  Constitutional: Appearance: Well nourished, well developed alert, in no acute distress  Chest:  Respiratory Effort:  Breathing unlabored  Cardiovascular:Heart    Auscultation:  Regular rate, normal rhythm, no murmurs present  Gastrointestinal:  Abdominal Examination:  Abdomen nontender to palpation, tone normal without     rigidity or guarding, no masses present, umbilicus without lesions    Liver and speen:  No hepatomegaly present, liver nontender to palpation    Hernias:  No hernias present    FHTs not auscultated due to early gestation and body habitus. She will have US soon for cervical length.  Skin:  General Inspection:  No rashes present, no lesions present, no areas of  discoloration.  Neurologic/Psychiatric:    Mental Status:  Oriented X3       Pelvis: External genitalia, Bartholin, urethral, and [place] glands within normal limits. Urethra is without lesion and nontender to palpation. Bladder is nontender. On speculum exam, cervix is without lesion and vagina is normal without lesion or discharge. Pap smear obtained without incident. Small amount of spotting after pap.    ASSESSMENT:      ICD-10-CM    1. Supervision of high risk pregnancy, antepartum O09.90    2. History of  delivery, currently pregnant O09.219 HYDROXYprogesterone caproate (CORNELIA) 250 MG/ML injection     Maternal Fetal US OB Transvaginal - Cervical Length   3. Morbid obesity due to excess calories (H) E66.01    4. Antepartum multigravida of advanced maternal age O09.529        PLAN:    Prenatal labs reviewed. She has no questions.    Discussed options for screening for and diagnosis of chromosomal anomalies, including first trimester screen, noninvasive prenatal testing/cell-free fetal DNA testing, CVS/amniocentesis, quad " screen, and ultrasound or comprehensive Level II US at 18-20 weeks. She is electing noninvasive prenatal testing most likely, though she will check with insurance. Consent obtained and signed.    Due to history of  delivery at 30 weeks, I recommended 17 OHP weekly beginning at 16 weeks, and cervical length measurements beginning at 14-16 weeks through 24 weeks. Plan consult in [facility] for this ultrasound follow up.    AMA at delivery: plan level II US at 18-20weeks, likely noninvasive prenatal testing.    Due to MI >40, plan weekly BPP after 36 weeks and delivery at 39-40w if not delivered sooner.    Reviewed early pregnancy education, diet, exercise, prenatal vitamins, intercourse. Reviewed the call schedule, labor and delivery, and the schedule of prenatal visits.    Follow up in 4 weeks. She is encouraged to call sooner with questions or concerns.      Pao Fleming MD

## 2019-06-25 ENCOUNTER — TELEPHONE (OUTPATIENT)
Dept: OBGYN | Facility: CLINIC | Age: 34
End: 2019-06-25

## 2019-06-25 DIAGNOSIS — O09.899 HISTORY OF PRETERM DELIVERY, CURRENTLY PREGNANT: Primary | ICD-10-CM

## 2019-06-26 ENCOUNTER — TRANSCRIBE ORDERS (OUTPATIENT)
Dept: MATERNAL FETAL MEDICINE | Facility: CLINIC | Age: 34
End: 2019-06-26

## 2019-06-26 DIAGNOSIS — O26.90 PREGNANCY RELATED CONDITION, ANTEPARTUM: Primary | ICD-10-CM

## 2019-06-26 LAB
COPATH REPORT: NORMAL
PAP: NORMAL

## 2019-06-27 LAB
FINAL DIAGNOSIS: NORMAL
HPV HR 12 DNA CVX QL NAA+PROBE: NEGATIVE
HPV16 DNA SPEC QL NAA+PROBE: NEGATIVE
HPV18 DNA SPEC QL NAA+PROBE: NEGATIVE
SPECIMEN DESCRIPTION: NORMAL
SPECIMEN SOURCE CVX/VAG CYTO: NORMAL

## 2019-07-03 ENCOUNTER — ALLIED HEALTH/NURSE VISIT (OUTPATIENT)
Dept: NURSING | Facility: CLINIC | Age: 34
End: 2019-07-03
Payer: COMMERCIAL

## 2019-07-03 ENCOUNTER — TELEPHONE (OUTPATIENT)
Dept: OBGYN | Facility: CLINIC | Age: 34
End: 2019-07-03

## 2019-07-03 VITALS — WEIGHT: 277 LBS | BODY MASS INDEX: 43.47 KG/M2 | HEIGHT: 67 IN

## 2019-07-03 DIAGNOSIS — O09.899 HISTORY OF PRETERM DELIVERY, CURRENTLY PREGNANT: Primary | ICD-10-CM

## 2019-07-03 DIAGNOSIS — Z34.82 PRENATAL CARE, SUBSEQUENT PREGNANCY, SECOND TRIMESTER: Primary | ICD-10-CM

## 2019-07-03 PROCEDURE — 40000791 ZZHCL STATISTIC VERIFI PRENATAL TRISOMY 21,18,13: Mod: 90 | Performed by: OBSTETRICS & GYNECOLOGY

## 2019-07-03 PROCEDURE — 99000 SPECIMEN HANDLING OFFICE-LAB: CPT | Performed by: OBSTETRICS & GYNECOLOGY

## 2019-07-03 PROCEDURE — 99207 ZZC NO CHARGE NURSE ONLY: CPT

## 2019-07-03 PROCEDURE — 40001065 ZZHCL STATISTIC PREPARENT STANDARD PANEL: Mod: 90 | Performed by: OBSTETRICS & GYNECOLOGY

## 2019-07-03 PROCEDURE — 36415 COLL VENOUS BLD VENIPUNCTURE: CPT | Performed by: OBSTETRICS & GYNECOLOGY

## 2019-07-03 ASSESSMENT — MIFFLIN-ST. JEOR: SCORE: 1989.09

## 2019-07-03 NOTE — TELEPHONE ENCOUNTER
Spoke with the pharmacy. The Brand and generic [place] are still very expensive, even after the PA went through.     The manufactor will offer co-pay assistance for the Brand mMkena 275 mg  Per 1.1 ml.     Is this okay to rx for the pt?     Sindhu RAMOS RN

## 2019-07-03 NOTE — TELEPHONE ENCOUNTER
[facility] Prior Authorization Team   Phone: [phone]  Fax: [phone]    PA Initiation    Medication: [place] [employer] brand auto injectors  Insurance Company: [employer] - Phone [phone] Fax [phone]  Pharmacy Filling the Rx: [facility] - [place] [address]  Filling Pharmacy Phone: [phone]  Filling Pharmacy Fax:    Start Date: 7/3/2019    Patient is requesting brand name auto injectors

## 2019-07-03 NOTE — TELEPHONE ENCOUNTER
[facility] Prior Authorization Team   Phone: [phone]  Fax: [phone]    Prior Authorization Approval    Authorization Effective Date:    Authorization Expiration Date:    Medication: Ashlyn brand auto injectors - Approved  Approved Dose/Quantity: 4.4 per 28 days  Insurance Company: [employer] - Phone [phone] Fax [phone]  Expected CoPay: $3394.88      CoPay Card Available: yes    Which Pharmacy is filling the prescription (Not needed for infusion/clinic administered): [facility] - [place], [place] - [address] [place] [address] [facility]  Pharmacy Notified: Yes  Patient Notified: Yes    Will request a new rx for auto injectors

## 2019-07-10 LAB
LAB SCANNED RESULT: NORMAL
LAB SCANNED RESULT: NORMAL

## 2019-07-15 ENCOUNTER — TELEPHONE (OUTPATIENT)
Dept: OBGYN | Facility: CLINIC | Age: 34
End: 2019-07-15

## 2019-07-15 NOTE — TELEPHONE ENCOUNTER
Results received from [facility] testing in [facility]..  Testing done:  Innatal Prenatal Screen and Preparent Carrier Screen    Action:  Spoke to pt and gave NORMAL results.    Gender:**BOY**  Gender revealed to pt on the phone.    Routed to provider as SHERRELL RAMOS RN

## 2019-07-16 ENCOUNTER — PRENATAL OFFICE VISIT (OUTPATIENT)
Dept: OBGYN | Facility: CLINIC | Age: 34
End: 2019-07-16
Payer: COMMERCIAL

## 2019-07-16 VITALS — WEIGHT: 277 LBS | SYSTOLIC BLOOD PRESSURE: 120 MMHG | BODY MASS INDEX: 43.38 KG/M2 | DIASTOLIC BLOOD PRESSURE: 70 MMHG

## 2019-07-16 DIAGNOSIS — O09.90 SUPERVISION OF HIGH RISK PREGNANCY, ANTEPARTUM: ICD-10-CM

## 2019-07-16 DIAGNOSIS — E66.01 MORBID OBESITY DUE TO EXCESS CALORIES (H): ICD-10-CM

## 2019-07-16 DIAGNOSIS — O09.899 HISTORY OF PRETERM DELIVERY, CURRENTLY PREGNANT: Primary | ICD-10-CM

## 2019-07-16 PROCEDURE — 99207 ZZC PRENATAL VISIT: CPT | Performed by: OBSTETRICS & GYNECOLOGY

## 2019-07-16 NOTE — NURSING NOTE
"Chief Complaint   Patient presents with     Prenatal Care       Initial /70   Wt 125.6 kg (277 lb)   LMP 2019 (Exact Date)   Breastfeeding? No   BMI 43.38 kg/m   Estimated body mass index is 43.38 kg/m  as calculated from the following:    Height as of 7/3/19: 1.702 m (5' 7\").    Weight as of this encounter: 125.6 kg (277 lb).  BP completed using cuff size: large    Questioned patient about current smoking habits.  Pt. has never smoked.          14w6d  - cramping or spotting  + fatigue  - headache or heartburn  Beth Johnson LPN               "

## 2019-07-16 NOTE — PROGRESS NOTES
PROBLEM LIST  LABS: Apos/RI BOY    1. Prior PTD at 30w: serial cervical lengths, [place] 16-36w.  2. Elevated BMI: would plan Lovenox 40 mg daily PP in house only. Deliver by 40w.    Doing well. Discussed noninvasive prenatal testing results. She is scheduled for cervical length US, and has [place] ready to start at 16 weeks. Follow up with me in 4 weeks.    Pao Fleming MD

## 2019-07-23 ENCOUNTER — PRE VISIT (OUTPATIENT)
Dept: MATERNAL FETAL MEDICINE | Facility: CLINIC | Age: 34
End: 2019-07-23

## 2019-07-26 ENCOUNTER — HOSPITAL ENCOUNTER (OUTPATIENT)
Dept: ULTRASOUND IMAGING | Facility: CLINIC | Age: 34
Discharge: HOME | End: 2019-07-26
Attending: OBSTETRICS & GYNECOLOGY | Admitting: OBSTETRICS & GYNECOLOGY
Payer: COMMERCIAL

## 2019-07-26 ENCOUNTER — OFFICE VISIT (OUTPATIENT)
Dept: MATERNAL FETAL MEDICINE | Facility: CLINIC | Age: 34
End: 2019-07-26
Attending: OBSTETRICS & GYNECOLOGY
Payer: COMMERCIAL

## 2019-07-26 ENCOUNTER — ALLIED HEALTH/NURSE VISIT (OUTPATIENT)
Dept: NURSING | Facility: CLINIC | Age: 34
End: 2019-07-26
Payer: COMMERCIAL

## 2019-07-26 DIAGNOSIS — O26.90 PREGNANCY RELATED CONDITION, ANTEPARTUM: ICD-10-CM

## 2019-07-26 DIAGNOSIS — O99.212 OBESITY AFFECTING PREGNANCY IN SECOND TRIMESTER: ICD-10-CM

## 2019-07-26 DIAGNOSIS — O09.899 HISTORY OF PRETERM DELIVERY, CURRENTLY PREGNANT: ICD-10-CM

## 2019-07-26 DIAGNOSIS — O09.219 HISTORY OF PRETERM LABOR, CURRENT PREGNANCY: Primary | ICD-10-CM

## 2019-07-26 DIAGNOSIS — O09.522 MULTIGRAVIDA OF ADVANCED MATERNAL AGE IN SECOND TRIMESTER: ICD-10-CM

## 2019-07-26 DIAGNOSIS — O09.899 HISTORY OF PRETERM DELIVERY, CURRENTLY PREGNANT: Primary | ICD-10-CM

## 2019-07-26 PROCEDURE — 99207 ZZC NO CHARGE NURSE ONLY: CPT

## 2019-07-26 PROCEDURE — 76817 TRANSVAGINAL US OBSTETRIC: CPT | Performed by: OBSTETRICS & GYNECOLOGY

## 2019-07-26 PROCEDURE — 96040 ZZH GENETIC COUNSELING, EACH 30 MINUTES: CPT | Performed by: GENETIC COUNSELOR, MS

## 2019-07-26 PROCEDURE — 96372 THER/PROPH/DIAG INJ SC/IM: CPT

## 2019-07-26 PROCEDURE — 76805 OB US >/= 14 WKS SNGL FETUS: CPT

## 2019-07-26 PROCEDURE — 40000072 ZZH STATISTIC GENETIC COUNSELING, < 16 MIN: Mod: ZF | Performed by: GENETIC COUNSELOR, MS

## 2019-07-26 NOTE — PROGRESS NOTES
[facility] [place]  Genetic Counseling Consult    Patient:  Yudelka Clarke YOB: 1985   Date of Service:  19      Yudelka Clarke was seen at the [facility] [place] for genetic consultation as part of her appointment for 2/3 ultrasound.  The indication for genetic counseling is advanced maternal age. The patient was unaccompanied to today's visit.        Impression/Plan:   1. Yudelka had a cell-free fetal DNA test earlier in pregnancy, which was normal.    2. Yudelka had a 2/3 ultrasound today.  Please see the ultrasound report for further details.    3. The patient declines genetic amniocentesis today.    4. Comprehensive ultrasound scheduled for 19.    Pregnancy History:   /Parity:    Age at Delivery: 34 year old  BLOSSOM: 2020, by Last Menstrual Period  Gestational Age: 16w2d    No significant complications or exposures were reported in the current pregnancy.    Yudelka s pregnancy history is significant for one  delivery at 30 weeks of a healthy boy.    Medical History:   Yudelka s reported medical history is not expected to impact pregnancy management or risks to fetal development.       Family History:   A three-generation pedigree was previously obtained, see genetic counseling note from 16. The family history was updated today and is scanned under the  Media  tab.   The following new significant findings were reported by Yudelka:    Yudelka and her partner, Jose Angel have a healthy two year old boy. He was reported to have some speech delay and is receiving speech therapy services. He has no other health concerns. He was born premature at 30 weeks.    Otherwise, the reported family history is negative for multiple miscarriages, stillbirths, birth defects, intellectual disability, known genetic conditions, and consanguinity.       Carrier Screening:   The patient reports that she and the  father of the pregnancy have  ancestry:     Cystic fibrosis is an autosomal recessive genetic condition that occurs with increased frequency in individuals of  ancestry and carrier screening for this condition is available.  In addition,  screening in the [facility] includes cystic fibrosis.      Expanded carrier screening for mutations in a large panel of genes associated with autosomal recessive conditions including cystic fibrosis, spinal muscular atrophy, and others, is now available.      The patient has had previous carrier screening through her primary OB for 29 conditions on the Supersolidnt carrier screen panel, the results of which were all negative.  A copy of the report was available for our review today and is located in the lab section.       Risk Assessment for Chromosome Conditions:   We explained that the risk for fetal chromosome abnormalities increases with maternal age. We discussed specific features of common chromosome abnormalities, including Down syndrome, trisomy 13, trisomy 18, and sex chromosome trisomies.      - At age 34 at midtrimester, the risk to have a baby with Down syndrome is 1 in 274.     - At age 34 at midtrimester, the risk to have a baby with any chromosome abnormality is 1 in 135.       Yudelka had maternal serum screening earlier in pregnancy.     Non-invasive Prenatal Testing (NIPT)    Maternal plasma cell-free DNA testing    Screens for fetal trisomy 21, trisomy 13, trisomy 18, and sex chromosome aneuploidy    First trimester ultrasound with nuchal translucency and nasal bone assessment was not performed in this pregnancy, to our knowledge.    Yudelka had a Innatal test earlier in pregnancy; we reviewed the results today, which are normal for chromosome 13, chromosome 18 and chromosome 21 (no aneuploidy detected)    Given the accuracy of this test, these results greatly decrease the chance for certain fetal chromosome  abnormalities    We discussed the limitations of normal NIPT results    MSAFP (after 15 weeks for open neural tube defect screening) results were not available for our review today.         Testing Options:   We discussed the following options:   Genetic Amniocentesis    Invasive procedure typically performed in the second trimester by which amniotic fluid is obtained for the purpose of chromosome analysis and/or other prenatal genetic analysis    Diagnostic results; >99% sensitivity for fetal chromosome abnormalities    AFAFP measurement tests for open neural tube defects       Comprehensive (Level II) ultrasound: Detailed ultrasound performed between 18-22 weeks gestation to screen for major birth defects and markers for aneuploidy.      We reviewed the benefits and limitations of this testing.  Screening tests provide a risk assessment specific to the pregnancy for certain fetal chromosome abnormalities, but cannot definitively diagnose or exclude a fetal chromosome abnormality.  Follow-up genetic counseling and consideration of diagnostic testing is recommended with any abnormal screening result.     Diagnostic tests carry inherent risks- including risk of miscarriage- that require careful consideration.  These tests can detect fetal chromosome abnormalities with greater than 99% certainty.  Results can be compromised by maternal cell contamination or mosaicism, and are limited by the resolution of cytogenetic G-banding technology.  There is no screening nor diagnostic test that can detect all forms of birth defects or mental disability.    It was a pleasure to be involved with Yudelka s care. Face-to-face time of the meeting was 20 minutes.    Jessy Cid MS, GC  Maternal Fetal Medicine  [facility]  Ph: [phone]  [email or web address]    Patient seen, evaluated and discussed with the Genetic Counseling Intern. I have verified the content of the note, which accurately reflects my assessment of  the patient and the plan of care.   Supervising Genetic Counselor   Esdras Larson MS, [facility]   Maternal Fetal Medicine  [facility]   Phone: [phone]   Email: [email or web address]

## 2019-07-26 NOTE — PROGRESS NOTES
"Please see \"Imaging\" tab under \"Chart Review\" for details of today's US.    Rina Akers, DO    "

## 2019-07-26 NOTE — NURSING NOTE
Clinic Administered Medication Documentation  Patient Supplied Own Medication    Injectable Medication Documentation    Patient was given Ashlyn AutoInjector. Prior to medication administration, verified patients identity using patient s name and date of birth. Please see MAR and medication order for additional information. Patient instructed to remain in clinic for 15 minutes.      Was entire vial of medication used? Yes  Vial/Syringe: Single dose vial  Expiration Date:  12/2021  Was this medication supplied by the patient? Yes, Medication was received directly from patient (follow site specific policies)    Name of provider who requested the medication administration: Dr. Fleming  Name of provider on site (faculty or community preceptor) at the time of performing the medication administration: Dr. Dodd    Date of next administration: 8/02/2019  Date of next office visit with provider to renew medication plan (must be seen annually): 8/15/2019    Gregoria Rouse CMA

## 2019-08-02 ENCOUNTER — ALLIED HEALTH/NURSE VISIT (OUTPATIENT)
Dept: NURSING | Facility: CLINIC | Age: 34
End: 2019-08-02
Payer: COMMERCIAL

## 2019-08-02 DIAGNOSIS — O09.219 HISTORY OF PRETERM LABOR, CURRENT PREGNANCY: Primary | ICD-10-CM

## 2019-08-02 PROCEDURE — 99207 ZZC NO CHARGE NURSE ONLY: CPT

## 2019-08-02 PROCEDURE — 96372 THER/PROPH/DIAG INJ SC/IM: CPT

## 2019-08-02 NOTE — NURSING NOTE
Clinic Administered Medication Documentation      Injectable Medication Documentation    Patient was given [place]. Prior to medication administration, verified patients identity using patient s name and date of birth. Please see MAR and medication order for additional information. Patient instructed to report any adverse reaction to staff immediately .      Was entire vial of medication used? Yes  Vial/Syringe: Single dose vial  Expiration Date:  04/20221  Was this medication supplied by the patient? Yes    Name of provider who requested the medication administration: Dr. Fleming  Name of provider on site (faculty or community preceptor) at the time of performing the medication administration: Dr. Cross    Date of next administration: 8/9/19  Date of next office visit with provider to renew medication plan (must be seen annually): 8/15/19    Nery Barger CMA

## 2019-08-09 ENCOUNTER — ALLIED HEALTH/NURSE VISIT (OUTPATIENT)
Dept: NURSING | Facility: CLINIC | Age: 34
End: 2019-08-09
Payer: COMMERCIAL

## 2019-08-09 ENCOUNTER — HOSPITAL ENCOUNTER (OUTPATIENT)
Dept: ULTRASOUND IMAGING | Facility: CLINIC | Age: 34
Discharge: HOME | End: 2019-08-09
Attending: OBSTETRICS & GYNECOLOGY | Admitting: OBSTETRICS & GYNECOLOGY
Payer: COMMERCIAL

## 2019-08-09 ENCOUNTER — OFFICE VISIT (OUTPATIENT)
Dept: MATERNAL FETAL MEDICINE | Facility: CLINIC | Age: 34
End: 2019-08-09
Attending: OBSTETRICS & GYNECOLOGY
Payer: COMMERCIAL

## 2019-08-09 DIAGNOSIS — O09.899 HISTORY OF PRETERM DELIVERY, CURRENTLY PREGNANT: ICD-10-CM

## 2019-08-09 DIAGNOSIS — O09.899 HISTORY OF PRETERM DELIVERY, CURRENTLY PREGNANT: Primary | ICD-10-CM

## 2019-08-09 DIAGNOSIS — Z34.82 PRENATAL CARE, SUBSEQUENT PREGNANCY, SECOND TRIMESTER: Primary | ICD-10-CM

## 2019-08-09 DIAGNOSIS — O09.522 MULTIGRAVIDA OF ADVANCED MATERNAL AGE IN SECOND TRIMESTER: ICD-10-CM

## 2019-08-09 DIAGNOSIS — Z36.86 ENCOUNTER FOR ANTENATAL SCREENING FOR CERVICAL LENGTH: ICD-10-CM

## 2019-08-09 DIAGNOSIS — O99.212 OBESITY AFFECTING PREGNANCY IN SECOND TRIMESTER: ICD-10-CM

## 2019-08-09 PROCEDURE — 76817 TRANSVAGINAL US OBSTETRIC: CPT

## 2019-08-09 PROCEDURE — 96372 THER/PROPH/DIAG INJ SC/IM: CPT

## 2019-08-09 PROCEDURE — 99207 ZZC NO CHARGE NURSE ONLY: CPT

## 2019-08-09 NOTE — PROGRESS NOTES
Please refer to ultrasound report under 'Imaging' Studies of 'Chart Review' tabs.    Man White M.D.

## 2019-08-09 NOTE — NURSING NOTE
Clinic Administered Medication Documentation      Injectable Medication Documentation    Patient was given [place]. Prior to medication administration, verified patients identity using patient s name and date of birth. Please see MAR and medication order for additional information. Patient instructed to report any adverse reaction to staff immediately .      Was entire vial of medication used? Yes  Vial/Syringe: Syringe  Expiration Date:  12/2021  Was this medication supplied by the patient? Yes, Medication was received directly from pharmacy in a staff to staff handoff or via  delivery (follow site specific policies)  Beth Johnson LPN

## 2019-08-15 ENCOUNTER — PRENATAL OFFICE VISIT (OUTPATIENT)
Dept: OBGYN | Facility: CLINIC | Age: 34
End: 2019-08-15
Payer: COMMERCIAL

## 2019-08-15 VITALS — SYSTOLIC BLOOD PRESSURE: 116 MMHG | DIASTOLIC BLOOD PRESSURE: 64 MMHG | BODY MASS INDEX: 44.17 KG/M2 | WEIGHT: 282 LBS

## 2019-08-15 DIAGNOSIS — O09.90 SUPERVISION OF HIGH RISK PREGNANCY, ANTEPARTUM: ICD-10-CM

## 2019-08-15 DIAGNOSIS — O09.899 HISTORY OF PRETERM DELIVERY, CURRENTLY PREGNANT: Primary | ICD-10-CM

## 2019-08-15 PROCEDURE — 99207 ZZC PRENATAL VISIT: CPT | Performed by: OBSTETRICS & GYNECOLOGY

## 2019-08-15 PROCEDURE — 96372 THER/PROPH/DIAG INJ SC/IM: CPT | Performed by: OBSTETRICS & GYNECOLOGY

## 2019-08-15 NOTE — NURSING NOTE
"Chief Complaint   Patient presents with     Prenatal Care       Initial /64   Wt 127.9 kg (282 lb)   LMP 2019 (Exact Date)   Breastfeeding? No   BMI 44.17 kg/m   Estimated body mass index is 44.17 kg/m  as calculated from the following:    Height as of 7/3/19: 1.702 m (5' 7\").    Weight as of this encounter: 127.9 kg (282 lb).  BP completed using cuff size: large    Questioned patient about current smoking habits.  Pt. has never smoked.          19w1d  + FM noted  - cramping or bleeding  - nausea or vomiting  - headache   + mild heartburn  Beth Johnson LPN               "

## 2019-08-15 NOTE — PROGRESS NOTES
PROBLEM LIST  LABS: Apos/RI BOY    1. Prior PTD at 30w: serial cervical lengths, [place] 16-36w.  2. Elevated BMI: would plan Lovenox 40 mg daily PP in house only. Deliver by 40w.    Doing well. Cervical length US normal. Plan follow up in [facility]. Continue 17OHP.     Pao Fleming MD

## 2019-08-23 ENCOUNTER — OFFICE VISIT (OUTPATIENT)
Dept: MATERNAL FETAL MEDICINE | Facility: CLINIC | Age: 34
End: 2019-08-23
Attending: OBSTETRICS & GYNECOLOGY
Payer: COMMERCIAL

## 2019-08-23 ENCOUNTER — HOSPITAL ENCOUNTER (OUTPATIENT)
Dept: ULTRASOUND IMAGING | Facility: CLINIC | Age: 34
Discharge: HOME | End: 2019-08-23
Attending: OBSTETRICS & GYNECOLOGY | Admitting: OBSTETRICS & GYNECOLOGY
Payer: COMMERCIAL

## 2019-08-23 ENCOUNTER — ALLIED HEALTH/NURSE VISIT (OUTPATIENT)
Dept: NURSING | Facility: CLINIC | Age: 34
End: 2019-08-23
Payer: COMMERCIAL

## 2019-08-23 DIAGNOSIS — O09.522 MULTIGRAVIDA OF ADVANCED MATERNAL AGE IN SECOND TRIMESTER: ICD-10-CM

## 2019-08-23 DIAGNOSIS — O99.212 OBESITY AFFECTING PREGNANCY IN SECOND TRIMESTER: ICD-10-CM

## 2019-08-23 DIAGNOSIS — Z79.899 MEDICATION MANAGEMENT: Primary | ICD-10-CM

## 2019-08-23 DIAGNOSIS — O09.899 HISTORY OF PRETERM DELIVERY, CURRENTLY PREGNANT: ICD-10-CM

## 2019-08-23 DIAGNOSIS — O09.899 HISTORY OF PRETERM DELIVERY, CURRENTLY PREGNANT: Primary | ICD-10-CM

## 2019-08-23 PROCEDURE — 99207 ZZC NO CHARGE NURSE ONLY: CPT

## 2019-08-23 PROCEDURE — 76817 TRANSVAGINAL US OBSTETRIC: CPT | Performed by: OBSTETRICS & GYNECOLOGY

## 2019-08-23 PROCEDURE — 76811 OB US DETAILED SNGL FETUS: CPT

## 2019-08-23 PROCEDURE — 96372 THER/PROPH/DIAG INJ SC/IM: CPT

## 2019-08-23 NOTE — PROGRESS NOTES
"Please see \"Imaging\" tab under \"Chart Review\" for details of today's US at the [facility].    Ronn Major MD  Maternal-Fetal Medicine    "

## 2019-08-23 NOTE — NURSING NOTE
Clinic Administered Medication Documentation    Ashlyn   Lot # [id number]  Exp: 12/21  NDC # 35523-056-91  Abby Abreu CMA  Left arm

## 2019-08-30 ENCOUNTER — ALLIED HEALTH/NURSE VISIT (OUTPATIENT)
Dept: NURSING | Facility: CLINIC | Age: 34
End: 2019-08-30
Payer: COMMERCIAL

## 2019-08-30 DIAGNOSIS — Z79.899 MEDICATION MANAGEMENT: Primary | ICD-10-CM

## 2019-08-30 PROCEDURE — 96372 THER/PROPH/DIAG INJ SC/IM: CPT

## 2019-08-30 PROCEDURE — 99207 ZZC NO CHARGE LOS: CPT

## 2019-08-30 PROCEDURE — 99207 ZZC NO CHARGE NURSE ONLY: CPT

## 2019-08-30 NOTE — NURSING NOTE
"Chief Complaint   Patient presents with     Allied Health Visit     [place] injection       Initial LMP 2019 (Exact Date)   Breastfeeding? No  Estimated body mass index is 44.17 kg/m  as calculated from the following:    Height as of 7/3/19: 1.702 m (5' 7\").    Weight as of 8/15/19: 127.9 kg (282 lb).  BP completed using cuff size: NA (Not Taken)    Questioned patient about current smoking habits.  Pt. has never smoked.    21w2d      The following HM Due: NONE      Clinic Administered Medication Documentation    MEDICATION LIST:   Injectable Medication Documentation    Patient was given Ashlyn. Prior to medication administration, verified patients identity using patient s name and date of birth. Please see MAR and medication order for additional information. Patient instructed to remain in clinic for 15 minutes.      Was entire vial of medication used? Yes  Vial/Syringe: Single dose vial  Expiration Date:  2022  Was this medication supplied by the patient? No       Jasmyn Almeida, CMA on 2019 at 9:34 AM           "

## 2019-09-04 ENCOUNTER — HOSPITAL ENCOUNTER (OUTPATIENT)
Dept: ULTRASOUND IMAGING | Facility: CLINIC | Age: 34
Discharge: HOME | End: 2019-09-04
Attending: OBSTETRICS & GYNECOLOGY | Admitting: OBSTETRICS & GYNECOLOGY
Payer: COMMERCIAL

## 2019-09-04 ENCOUNTER — OFFICE VISIT (OUTPATIENT)
Dept: MATERNAL FETAL MEDICINE | Facility: CLINIC | Age: 34
End: 2019-09-04
Attending: OBSTETRICS & GYNECOLOGY
Payer: COMMERCIAL

## 2019-09-04 DIAGNOSIS — O99.212 OBESITY AFFECTING PREGNANCY IN SECOND TRIMESTER: ICD-10-CM

## 2019-09-04 DIAGNOSIS — O09.899 HISTORY OF PRETERM DELIVERY, CURRENTLY PREGNANT: ICD-10-CM

## 2019-09-04 DIAGNOSIS — O09.899 HISTORY OF PRETERM DELIVERY, CURRENTLY PREGNANT: Primary | ICD-10-CM

## 2019-09-04 PROCEDURE — 76817 TRANSVAGINAL US OBSTETRIC: CPT

## 2019-09-04 NOTE — PROGRESS NOTES
"Please see \"Imaging\" tab under Chart Review for full details.    Lana Jamison MD  Maternal Fetal Medicine    "

## 2019-09-06 ENCOUNTER — ALLIED HEALTH/NURSE VISIT (OUTPATIENT)
Dept: NURSING | Facility: CLINIC | Age: 34
End: 2019-09-06
Payer: COMMERCIAL

## 2019-09-06 DIAGNOSIS — O09.219 HISTORY OF PRETERM LABOR, CURRENT PREGNANCY: Primary | ICD-10-CM

## 2019-09-06 PROCEDURE — 96372 THER/PROPH/DIAG INJ SC/IM: CPT

## 2019-09-06 PROCEDURE — 99207 ZZC NO CHARGE NURSE ONLY: CPT

## 2019-09-06 NOTE — NURSING NOTE
Patient here for nurse only inna injection. Patient noted itching at injection sites. Minimal swelling and bruising was noted. Patient instructed to watch sites and let us know if redness/increased swelling or signs of infection occur.   Nery Barger, CMA

## 2019-09-13 ENCOUNTER — PRENATAL OFFICE VISIT (OUTPATIENT)
Dept: OBGYN | Facility: CLINIC | Age: 34
End: 2019-09-13
Payer: COMMERCIAL

## 2019-09-13 VITALS — SYSTOLIC BLOOD PRESSURE: 120 MMHG | WEIGHT: 285 LBS | BODY MASS INDEX: 44.64 KG/M2 | DIASTOLIC BLOOD PRESSURE: 78 MMHG

## 2019-09-13 DIAGNOSIS — O09.899 HISTORY OF PRETERM DELIVERY, CURRENTLY PREGNANT: ICD-10-CM

## 2019-09-13 DIAGNOSIS — O09.90 SUPERVISION OF HIGH RISK PREGNANCY, ANTEPARTUM: ICD-10-CM

## 2019-09-13 DIAGNOSIS — Z23 NEED FOR PROPHYLACTIC VACCINATION AND INOCULATION AGAINST INFLUENZA: Primary | ICD-10-CM

## 2019-09-13 PROCEDURE — 90471 IMMUNIZATION ADMIN: CPT | Performed by: OBSTETRICS & GYNECOLOGY

## 2019-09-13 PROCEDURE — 90686 IIV4 VACC NO PRSV 0.5 ML IM: CPT | Performed by: OBSTETRICS & GYNECOLOGY

## 2019-09-13 PROCEDURE — 96372 THER/PROPH/DIAG INJ SC/IM: CPT | Performed by: OBSTETRICS & GYNECOLOGY

## 2019-09-13 PROCEDURE — 99207 ZZC PRENATAL VISIT: CPT | Performed by: OBSTETRICS & GYNECOLOGY

## 2019-09-13 NOTE — NURSING NOTE
"Chief Complaint   Patient presents with     Prenatal Care       Initial /78 (BP Location: Left arm, Cuff Size: Adult Large)   Wt 129.3 kg (285 lb)   LMP 2019 (Exact Date)   BMI 44.64 kg/m   Estimated body mass index is 44.64 kg/m  as calculated from the following:    Height as of 7/3/19: 1.702 m (5' 7\").    Weight as of this encounter: 129.3 kg (285 lb).  BP completed using cuff size: large    Questioned patient about current smoking habits.  Pt. has never smoked.          Reports redness, bruising and itching at injection site of inna.      Eric Robertson, [facility]               "

## 2019-09-13 NOTE — PROGRESS NOTES
PROBLEM LIST  LABS: Apos/RI BOY    1. Prior PTD at 30w: serial cervical lengths, [place] 16-36w.  2. Elevated BMI: would plan Lovenox 40 mg daily PP in house only. Deliver by 40w.    Doing well. Cervical length US normal. Some irritation of the skin/bruising with subcutaneous [place]--discussed doing IM but she would like to just stick with this for now. Follow up in 1 month, sooner if needed. Denies any signs and symptoms of  labor today.    Pao Fleming MD

## 2019-09-13 NOTE — NURSING NOTE
Clinic Administered Medication Documentation    MEDICATION LIST:   Injectable Medication Documentation    Patient was given [place] Auto-Injector. Prior to medication administration, verified patients identity using patient s name and date of birth. Please see MAR and medication order for additional information. Patient instructed to remain in clinic for 15 minutes and report any adverse reaction to staff immediately .      Was entire vial of medication used? Yes  Vial/Syringe: Syringe  Expiration Date:  04/2022  Was this medication supplied by the patient? Yes, Medication was received directly from pharmacy in a staff to staff handoff or via  delivery (follow site specific policies)      Eric Robertson, [facility]

## 2019-09-20 ENCOUNTER — HOSPITAL ENCOUNTER (OUTPATIENT)
Dept: ULTRASOUND IMAGING | Facility: CLINIC | Age: 34
Discharge: HOME | End: 2019-09-20
Attending: OBSTETRICS & GYNECOLOGY | Admitting: OBSTETRICS & GYNECOLOGY
Payer: COMMERCIAL

## 2019-09-20 ENCOUNTER — ALLIED HEALTH/NURSE VISIT (OUTPATIENT)
Dept: NURSING | Facility: CLINIC | Age: 34
End: 2019-09-20
Payer: COMMERCIAL

## 2019-09-20 ENCOUNTER — OFFICE VISIT (OUTPATIENT)
Dept: MATERNAL FETAL MEDICINE | Facility: CLINIC | Age: 34
End: 2019-09-20
Attending: OBSTETRICS & GYNECOLOGY
Payer: COMMERCIAL

## 2019-09-20 DIAGNOSIS — O99.212 OBESITY AFFECTING PREGNANCY IN SECOND TRIMESTER: Primary | ICD-10-CM

## 2019-09-20 DIAGNOSIS — Z23 ENCOUNTER FOR IMMUNIZATION: Primary | ICD-10-CM

## 2019-09-20 DIAGNOSIS — O09.899 HISTORY OF PRETERM DELIVERY, CURRENTLY PREGNANT: ICD-10-CM

## 2019-09-20 DIAGNOSIS — O99.212 OBESITY AFFECTING PREGNANCY IN SECOND TRIMESTER: ICD-10-CM

## 2019-09-20 PROCEDURE — 76817 TRANSVAGINAL US OBSTETRIC: CPT | Performed by: OBSTETRICS & GYNECOLOGY

## 2019-09-20 PROCEDURE — 99207 ZZC NO CHARGE NURSE ONLY: CPT

## 2019-09-20 PROCEDURE — 76816 OB US FOLLOW-UP PER FETUS: CPT

## 2019-09-20 PROCEDURE — 96372 THER/PROPH/DIAG INJ SC/IM: CPT

## 2019-09-20 NOTE — PROGRESS NOTES
Please see full imaging report from ViewPoint program under imaging tab.      Kasey Harris MD  Maternal Fetal Medicine

## 2019-09-20 NOTE — NURSING NOTE
The following medication was given:     MEDICATION: Hydroxyprogesterone Caproate 250mg/mL in oil (Ashlyn)  ROUTE: SQ  SITE: Arm - Left  DOSE: 275 mg  LOT #: [id number]  :  [employer]  EXPIRATION DATE:  4/22  NDC#: 11839-513-83    Pt tolerated injection well  Abby Abreu CMA

## 2019-09-27 ENCOUNTER — ALLIED HEALTH/NURSE VISIT (OUTPATIENT)
Dept: NURSING | Facility: CLINIC | Age: 34
End: 2019-09-27
Payer: COMMERCIAL

## 2019-09-27 DIAGNOSIS — O09.212 CURRENT PREGNANCY WITH HISTORY OF PRE-TERM LABOR IN SECOND TRIMESTER: Primary | ICD-10-CM

## 2019-09-27 PROCEDURE — 99207 ZZC NO CHARGE NURSE ONLY: CPT

## 2019-09-27 PROCEDURE — 96372 THER/PROPH/DIAG INJ SC/IM: CPT

## 2019-10-04 ENCOUNTER — ALLIED HEALTH/NURSE VISIT (OUTPATIENT)
Dept: NURSING | Facility: CLINIC | Age: 34
End: 2019-10-04
Payer: COMMERCIAL

## 2019-10-04 DIAGNOSIS — O09.899 HISTORY OF PRETERM DELIVERY, CURRENTLY PREGNANT: Primary | ICD-10-CM

## 2019-10-04 PROCEDURE — 99207 ZZC NO CHARGE NURSE ONLY: CPT

## 2019-10-04 PROCEDURE — 96372 THER/PROPH/DIAG INJ SC/IM: CPT

## 2019-10-10 ENCOUNTER — PRENATAL OFFICE VISIT (OUTPATIENT)
Dept: OBGYN | Facility: CLINIC | Age: 34
End: 2019-10-10
Payer: COMMERCIAL

## 2019-10-10 VITALS — BODY MASS INDEX: 45.42 KG/M2 | DIASTOLIC BLOOD PRESSURE: 70 MMHG | WEIGHT: 290 LBS | SYSTOLIC BLOOD PRESSURE: 122 MMHG

## 2019-10-10 DIAGNOSIS — O09.90 SUPERVISION OF HIGH RISK PREGNANCY, ANTEPARTUM: Primary | ICD-10-CM

## 2019-10-10 LAB
ERYTHROCYTE [DISTWIDTH] IN BLOOD BY AUTOMATED COUNT: 13.6 % (ref 10–15)
HCT VFR BLD AUTO: 36 % (ref 35–47)
HGB BLD-MCNC: 11.8 G/DL (ref 11.7–15.7)
MCH RBC QN AUTO: 29.2 PG (ref 26.5–33)
MCHC RBC AUTO-ENTMCNC: 32.8 G/DL (ref 31.5–36.5)
MCV RBC AUTO: 89 FL (ref 78–100)
PLATELET # BLD AUTO: 218 10E9/L (ref 150–450)
RBC # BLD AUTO: 4.04 10E12/L (ref 3.8–5.2)
WBC # BLD AUTO: 10.1 10E9/L (ref 4–11)

## 2019-10-10 PROCEDURE — 86780 TREPONEMA PALLIDUM: CPT | Performed by: OBSTETRICS & GYNECOLOGY

## 2019-10-10 PROCEDURE — 99207 ZZC PRENATAL VISIT: CPT | Performed by: OBSTETRICS & GYNECOLOGY

## 2019-10-10 PROCEDURE — 96372 THER/PROPH/DIAG INJ SC/IM: CPT | Performed by: OBSTETRICS & GYNECOLOGY

## 2019-10-10 PROCEDURE — 85027 COMPLETE CBC AUTOMATED: CPT | Performed by: OBSTETRICS & GYNECOLOGY

## 2019-10-10 PROCEDURE — 82950 GLUCOSE TEST: CPT | Performed by: OBSTETRICS & GYNECOLOGY

## 2019-10-10 PROCEDURE — 36415 COLL VENOUS BLD VENIPUNCTURE: CPT | Performed by: OBSTETRICS & GYNECOLOGY

## 2019-10-10 NOTE — PROGRESS NOTES
PROBLEM LIST  LABS: Apos/RI BOY    1. Prior PTD at 30w: serial cervical lengths, [place] 16-36w.  2. Elevated BMI: would plan Lovenox 40 mg daily PP in house only. Deliver by 40w.    Doing well, no signs and symptoms of  labor. On Ashlyn. Follow up in 2 weeks, sooner if needed.  Pao Fleming MD

## 2019-10-10 NOTE — NURSING NOTE
"Chief Complaint   Patient presents with     Prenatal Care     27 1/7 weeks       Initial /70   Wt 131.5 kg (290 lb)   LMP 2019 (Exact Date)   BMI 45.42 kg/m   Estimated body mass index is 45.42 kg/m  as calculated from the following:    Height as of 7/3/19: 1.702 m (5' 7\").    Weight as of this encounter: 131.5 kg (290 lb).  BP completed using cuff size: large    Questioned patient about current smoking habits.  Pt. has never smoked.          The following HM Due: NONE      +fetal movement  -swelling    Abby Abreu, CMA    "

## 2019-10-11 LAB
GLUCOSE 1H P 50 G GLC PO SERPL-MCNC: 116 MG/DL (ref 60–129)
T PALLIDUM AB SER QL: NONREACTIVE

## 2019-10-18 ENCOUNTER — ALLIED HEALTH/NURSE VISIT (OUTPATIENT)
Dept: NURSING | Facility: CLINIC | Age: 34
End: 2019-10-18
Payer: COMMERCIAL

## 2019-10-18 DIAGNOSIS — Z23 ENCOUNTER FOR IMMUNIZATION: Primary | ICD-10-CM

## 2019-10-18 PROCEDURE — 99207 ZZC NO CHARGE NURSE ONLY: CPT

## 2019-10-18 PROCEDURE — 96372 THER/PROPH/DIAG INJ SC/IM: CPT

## 2019-10-18 NOTE — NURSING NOTE
Clinic Administered Medication Documentation    MEDICATION LIST:   Ashlyn  Lot # [id number]  Exp: 4/22  NDC 26271-704-58  Abby Abreu CMA

## 2019-10-24 ENCOUNTER — PRENATAL OFFICE VISIT (OUTPATIENT)
Dept: OBGYN | Facility: CLINIC | Age: 34
End: 2019-10-24
Payer: COMMERCIAL

## 2019-10-24 VITALS — WEIGHT: 291 LBS | BODY MASS INDEX: 45.58 KG/M2 | DIASTOLIC BLOOD PRESSURE: 74 MMHG | SYSTOLIC BLOOD PRESSURE: 122 MMHG

## 2019-10-24 DIAGNOSIS — O09.90 SUPERVISION OF HIGH RISK PREGNANCY, ANTEPARTUM: Primary | ICD-10-CM

## 2019-10-24 PROCEDURE — 96372 THER/PROPH/DIAG INJ SC/IM: CPT | Performed by: OBSTETRICS & GYNECOLOGY

## 2019-10-24 PROCEDURE — 90715 TDAP VACCINE 7 YRS/> IM: CPT | Performed by: OBSTETRICS & GYNECOLOGY

## 2019-10-24 PROCEDURE — 99207 ZZC NO CHARGE NURSE ONLY: CPT | Performed by: OBSTETRICS & GYNECOLOGY

## 2019-10-24 PROCEDURE — 99207 ZZC PRENATAL VISIT: CPT | Performed by: OBSTETRICS & GYNECOLOGY

## 2019-10-24 PROCEDURE — 90471 IMMUNIZATION ADMIN: CPT | Performed by: OBSTETRICS & GYNECOLOGY

## 2019-10-24 NOTE — NURSING NOTE
When giving patient Ashlyn injection, the auto injector did not deploy, was unable to give patient the medication with first pen. Called the company and they will send another Ashlyn injection to clinic.

## 2019-10-24 NOTE — NURSING NOTE
"Chief Complaint   Patient presents with     Prenatal Care     29 weeks 1 day, no questions or concerns. No c/o vaginal bleeding, cramping, leaking of fluids, feeling fetal movement daily       Initial /74 (BP Location: Right arm, Patient Position: Chair, Cuff Size: Adult Large)   Wt 132 kg (291 lb)   LMP 2019 (Exact Date)   BMI 45.58 kg/m   Estimated body mass index is 45.58 kg/m  as calculated from the following:    Height as of 7/3/19: 1.702 m (5' 7\").    Weight as of this encounter: 132 kg (291 lb).  BP completed using cuff size: large    Questioned patient about current smoking habits.  Pt. has never smoked.          The following HM Due: NONE      Jostin Rivers CMA             "

## 2019-11-01 ENCOUNTER — HOSPITAL ENCOUNTER (OUTPATIENT)
Dept: ULTRASOUND IMAGING | Facility: CLINIC | Age: 34
Discharge: HOME | End: 2019-11-01
Attending: OBSTETRICS & GYNECOLOGY | Admitting: OBSTETRICS & GYNECOLOGY
Payer: COMMERCIAL

## 2019-11-01 ENCOUNTER — DOCUMENTATION ONLY (OUTPATIENT)
Dept: OBGYN | Facility: CLINIC | Age: 34
End: 2019-11-01

## 2019-11-01 ENCOUNTER — ALLIED HEALTH/NURSE VISIT (OUTPATIENT)
Dept: NURSING | Facility: CLINIC | Age: 34
End: 2019-11-01
Payer: COMMERCIAL

## 2019-11-01 ENCOUNTER — OFFICE VISIT (OUTPATIENT)
Dept: MATERNAL FETAL MEDICINE | Facility: CLINIC | Age: 34
End: 2019-11-01
Attending: OBSTETRICS & GYNECOLOGY
Payer: COMMERCIAL

## 2019-11-01 DIAGNOSIS — O09.899 HISTORY OF PRETERM DELIVERY, CURRENTLY PREGNANT: Primary | ICD-10-CM

## 2019-11-01 DIAGNOSIS — O09.523 MULTIGRAVIDA OF ADVANCED MATERNAL AGE IN THIRD TRIMESTER: ICD-10-CM

## 2019-11-01 DIAGNOSIS — O09.899 HISTORY OF PRETERM DELIVERY, CURRENTLY PREGNANT: ICD-10-CM

## 2019-11-01 DIAGNOSIS — O99.213 OBESITY AFFECTING PREGNANCY IN THIRD TRIMESTER: Primary | ICD-10-CM

## 2019-11-01 DIAGNOSIS — O99.212 OBESITY AFFECTING PREGNANCY IN SECOND TRIMESTER: ICD-10-CM

## 2019-11-01 PROCEDURE — 99207 ZZC NO CHARGE NURSE ONLY: CPT

## 2019-11-01 PROCEDURE — 96372 THER/PROPH/DIAG INJ SC/IM: CPT

## 2019-11-01 PROCEDURE — 76816 OB US FOLLOW-UP PER FETUS: CPT

## 2019-11-01 NOTE — NURSING NOTE
Clinic Administered Medication Documentation    MEDICATION LIST:   Injectable Medication Documentation    Patient was given [place]. Prior to medication administration, verified patients identity using patient s name and date of birth. Please see MAR and medication order for additional information. Patient instructed to remain in clinic for 15 minutes and report any adverse reaction to staff immediately .      Was entire vial of medication used? Yes  Vial/Syringe: Syringe  Expiration Date:  11/2020  Was this medication supplied by the patient? Yes, Medication was received directly from pharmacy in a staff to staff handoff or via  delivery (follow site specific policies)      Eric Robertson, CMA

## 2019-11-01 NOTE — PROGRESS NOTES
Spoke with Ashlyn melchor to authorize additional refills for patient per her request. Information given to refill for 4 weeks. Pharmacy will contact patient for delivery confirmation. Beth Johnson LPN

## 2019-11-05 ENCOUNTER — PRENATAL OFFICE VISIT (OUTPATIENT)
Dept: OBGYN | Facility: CLINIC | Age: 34
End: 2019-11-05
Payer: COMMERCIAL

## 2019-11-05 VITALS — SYSTOLIC BLOOD PRESSURE: 118 MMHG | DIASTOLIC BLOOD PRESSURE: 60 MMHG | BODY MASS INDEX: 45.89 KG/M2 | WEIGHT: 293 LBS

## 2019-11-05 DIAGNOSIS — O09.90 SUPERVISION OF HIGH RISK PREGNANCY, ANTEPARTUM: Primary | ICD-10-CM

## 2019-11-05 DIAGNOSIS — O09.899 HISTORY OF PRETERM DELIVERY, CURRENTLY PREGNANT: ICD-10-CM

## 2019-11-05 PROCEDURE — 99207 ZZC PRENATAL VISIT: CPT | Performed by: OBSTETRICS & GYNECOLOGY

## 2019-11-05 NOTE — NURSING NOTE
"Chief Complaint   Patient presents with     Prenatal Care       Initial /60 (BP Location: Left arm, Cuff Size: Adult Large)   Wt 132.9 kg (293 lb)   LMP 2019 (Exact Date)   BMI 45.89 kg/m   Estimated body mass index is 45.89 kg/m  as calculated from the following:    Height as of 7/3/19: 1.702 m (5' 7\").    Weight as of this encounter: 132.9 kg (293 lb).  BP completed using cuff size: large    Questioned patient about current smoking habits.  Pt. has never smoked.        Eric Robertson, [facility]AM[facility]             "

## 2019-11-05 NOTE — PROGRESS NOTES
PROBLEM LIST  LABS: Apos/RI BOY    1. Prior PTD at 30w: serial cervical lengths, [place] 16-36w.  2. Elevated BMI: would plan Lovenox 40 mg daily PP in house only. Deliver by 40w.    Doing well, no signs and symptoms of  labor. Discussed new FDA recommendations with Ashlyn--nothing new now to do and she would like to continue injections. Follow up in 2 weeks.    Pao Fleming MD

## 2019-11-08 ENCOUNTER — ALLIED HEALTH/NURSE VISIT (OUTPATIENT)
Dept: NURSING | Facility: CLINIC | Age: 34
End: 2019-11-08
Payer: COMMERCIAL

## 2019-11-08 DIAGNOSIS — O09.899 HISTORY OF PRETERM DELIVERY, CURRENTLY PREGNANT: Primary | ICD-10-CM

## 2019-11-08 PROCEDURE — 99207 ZZC NO CHARGE NURSE ONLY: CPT

## 2019-11-08 PROCEDURE — 96372 THER/PROPH/DIAG INJ SC/IM: CPT

## 2019-11-12 ENCOUNTER — TELEPHONE (OUTPATIENT)
Dept: OBGYN | Facility: CLINIC | Age: 34
End: 2019-11-12

## 2019-11-12 NOTE — TELEPHONE ENCOUNTER
Pharmacy called to let us know that they will be shipping out the [place]. We should be getting it on 11/14/19.   Quantity of 4 will be shipped.    Sindhu RAMOS RN

## 2019-11-14 NOTE — TELEPHONE ENCOUNTER
Received Ashlyn medication in [facility].    Pt's Ashlyn Auto-injectors placed in medicine cupboard.    Lila Machuca CMA

## 2019-11-15 ENCOUNTER — ALLIED HEALTH/NURSE VISIT (OUTPATIENT)
Dept: NURSING | Facility: CLINIC | Age: 34
End: 2019-11-15
Payer: COMMERCIAL

## 2019-11-15 DIAGNOSIS — O09.899 HISTORY OF PRETERM DELIVERY, CURRENTLY PREGNANT: Primary | ICD-10-CM

## 2019-11-15 PROCEDURE — 99207 ZZC NO CHARGE NURSE ONLY: CPT

## 2019-11-15 PROCEDURE — 96372 THER/PROPH/DIAG INJ SC/IM: CPT

## 2019-11-15 NOTE — PROGRESS NOTES
Clinic Administered Medication Documentation    MEDICATION LIST:   Injectable Medication Documentation    Patient was given [place]. Prior to medication administration, verified patients identity using patient s name and date of birth. Please see MAR and medication order for additional information. Patient instructed to remain in clinic for 15 minutes and report any adverse reaction to staff immediately .      Was entire vial of medication used? Yes  Vial/Syringe: Syringe  Expiration Date:  03/2022  Was this medication supplied by the patient? Yes, Medication was received directly from pharmacy in a staff to staff handoff or via  delivery (follow site specific policies)     Eric Robertson, CMA

## 2019-11-22 ENCOUNTER — PRENATAL OFFICE VISIT (OUTPATIENT)
Dept: OBGYN | Facility: CLINIC | Age: 34
End: 2019-11-22
Payer: COMMERCIAL

## 2019-11-22 ENCOUNTER — ALLIED HEALTH/NURSE VISIT (OUTPATIENT)
Dept: NURSING | Facility: CLINIC | Age: 34
End: 2019-11-22
Payer: COMMERCIAL

## 2019-11-22 VITALS — WEIGHT: 293 LBS | SYSTOLIC BLOOD PRESSURE: 120 MMHG | DIASTOLIC BLOOD PRESSURE: 76 MMHG | BODY MASS INDEX: 46.99 KG/M2

## 2019-11-22 DIAGNOSIS — O09.90 SUPERVISION OF HIGH RISK PREGNANCY, ANTEPARTUM: Primary | ICD-10-CM

## 2019-11-22 DIAGNOSIS — O09.899 HISTORY OF PRETERM DELIVERY, CURRENTLY PREGNANT: Primary | ICD-10-CM

## 2019-11-22 DIAGNOSIS — O09.899 HISTORY OF PRETERM DELIVERY, CURRENTLY PREGNANT: ICD-10-CM

## 2019-11-22 PROCEDURE — 96372 THER/PROPH/DIAG INJ SC/IM: CPT

## 2019-11-22 PROCEDURE — 99207 ZZC NO CHARGE NURSE ONLY: CPT

## 2019-11-22 PROCEDURE — 99207 ZZC PRENATAL VISIT: CPT | Performed by: OBSTETRICS & GYNECOLOGY

## 2019-11-22 NOTE — NURSING NOTE
Clinic Administered Medication Documentation    MEDICATION LIST:   Injectable Medication Documentation    Patient was given [place]. Prior to medication administration, verified patients identity using patient s name and date of birth. Please see MAR and medication order for additional information. Patient instructed to remain in clinic for 15 minutes and report any adverse reaction to staff immediately .      Was entire vial of medication used? Yes  Vial/Syringe: Single dose syringe  Expiration Date:  03/2022  Was this medication supplied by the patient? Yes, Medication was received directly from pharmacy in a staff to staff handoff or via  delivery (follow site specific policies)      Eric Robertson, CMA

## 2019-11-22 NOTE — PROGRESS NOTES
PROBLEM LIST  LABS: Apos/RI BOY    1. Prior PTD at 30w: serial cervical lengths, [place] 16-36w.  2. Elevated BMI: would plan Lovenox 40 mg daily PP in house only. Deliver by 40w.    Doing well, no signs and symptoms of  labor. Follow up in 2 weeks.    Pao Fleming MD

## 2019-11-22 NOTE — NURSING NOTE
"Chief Complaint   Patient presents with     Prenatal Care       Initial /76   Wt 136.1 kg (300 lb)   LMP 2019 (Exact Date)   Breastfeeding No   BMI 46.99 kg/m   Estimated body mass index is 46.99 kg/m  as calculated from the following:    Height as of 7/3/19: 1.702 m (5' 7\").    Weight as of this encounter: 136.1 kg (300 lb).  BP completed using cuff size: large    Questioned patient about current smoking habits.  Pt. has never smoked.          33w2d  + FM daily  + mild edema  - cramping or bleeding  + jes nadja contractions  Beth Johnson LPN               "

## 2019-11-29 ENCOUNTER — ALLIED HEALTH/NURSE VISIT (OUTPATIENT)
Dept: NURSING | Facility: CLINIC | Age: 34
End: 2019-11-29
Payer: COMMERCIAL

## 2019-11-29 ENCOUNTER — TELEPHONE (OUTPATIENT)
Dept: OBGYN | Facility: CLINIC | Age: 34
End: 2019-11-29

## 2019-11-29 DIAGNOSIS — Z79.899 MEDICATION MANAGEMENT: Primary | ICD-10-CM

## 2019-11-29 PROCEDURE — 99207 ZZC NO CHARGE NURSE ONLY: CPT

## 2019-11-29 PROCEDURE — 96372 THER/PROPH/DIAG INJ SC/IM: CPT

## 2019-11-29 NOTE — NURSING NOTE
Pt here for [place] Injection.    Clinic Administered Medication Documentation    MEDICATION LIST:   Injectable Medication Documentation    Patient was given [place]. Prior to medication administration, verified patients identity using patient s name and date of birth. Please see MAR and medication order for additional information. Patient instructed to remain in clinic for 15 minutes.      Was entire vial of medication used? Yes  Vial/Syringe: Single dose vial  Expiration Date:  03/2022  Was this medication supplied by the patient? Yes, Medication was received Patient supplied via delivered by pharmacy.      Jasmyn Almeida CMA on 11/29/2019 at 9:21 AM

## 2019-11-29 NOTE — TELEPHONE ENCOUNTER
Reason for Call:  Form, our goal is to have forms completed with 72 hours, however, some forms may require a visit or additional information.    Type of letter, form or note:  medical    Who is the form from?: Insurance comp    Where did the form come from: form was faxed in    What clinic location was the form placed at?: [facility]    Where the form was placed: [employer]    What number is listed as a contact on the form?:        Additional comments: please complete and fax    Call taken on 11/29/2019 at 8:41 AM by Virginia Arredondo

## 2019-12-03 NOTE — TELEPHONE ENCOUNTER
Form completed and faxed to Venkatesh. Original placed in [place] nurse bin and station. Beth Johnson LPN

## 2019-12-05 ENCOUNTER — PRENATAL OFFICE VISIT (OUTPATIENT)
Dept: OBGYN | Facility: CLINIC | Age: 34
End: 2019-12-05
Payer: COMMERCIAL

## 2019-12-05 VITALS — DIASTOLIC BLOOD PRESSURE: 70 MMHG | SYSTOLIC BLOOD PRESSURE: 116 MMHG | WEIGHT: 293 LBS | BODY MASS INDEX: 47.57 KG/M2

## 2019-12-05 DIAGNOSIS — O09.899 HISTORY OF PRETERM DELIVERY, CURRENTLY PREGNANT: ICD-10-CM

## 2019-12-05 DIAGNOSIS — O09.90 SUPERVISION OF HIGH RISK PREGNANCY, ANTEPARTUM: Primary | ICD-10-CM

## 2019-12-05 PROCEDURE — 96372 THER/PROPH/DIAG INJ SC/IM: CPT | Performed by: OBSTETRICS & GYNECOLOGY

## 2019-12-05 PROCEDURE — 99207 ZZC PRENATAL VISIT: CPT | Performed by: OBSTETRICS & GYNECOLOGY

## 2019-12-05 NOTE — PROGRESS NOTES
PROBLEM LIST  LABS: Apos/RI BOY    1. Prior PTD at 30w: serial cervical lengths, [place] 16-36w.  2. Elevated BMI: would plan Lovenox 40 mg daily PP in house only. Deliver by 40w.    Doing well, no signs and symptoms of  labor. Follow up weekly, Group B Strep next week. Ashlyn through 36 weeks.     Pao Fleming MD

## 2019-12-05 NOTE — NURSING NOTE
"Chief Complaint   Patient presents with     Prenatal Care     35 weeks 1 day, no questions or concerns. No c/o vaginal bleeding, leaking of fluids, contractions. Feeling fetal movement daily       Initial /70 (BP Location: Right arm, Patient Position: Chair, Cuff Size: Adult Large)   Wt 137.8 kg (303 lb 11.2 oz)   LMP 2019 (Exact Date)   BMI 47.57 kg/m   Estimated body mass index is 47.57 kg/m  as calculated from the following:    Height as of 7/3/19: 1.702 m (5' 7\").    Weight as of this encounter: 137.8 kg (303 lb 11.2 oz).  BP completed using cuff size: large    Questioned patient about current smoking habits.  Pt. has never smoked.          The following HM Due: NONE      Jostin Rivers CMA               "

## 2019-12-05 NOTE — NURSING NOTE
The following medication was given:     MEDICATION: Hydroxyprogesterone Caproate 250mg/mL in oil (Ashlyn)  ROUTE: SQ  SITE: Arm - Right  DOSE: 250mg  LOT #: see MAR for details  :  [employer]  EXPIRATION DATE:  See MAR for details  NDC#: see MAR for details  Beth Johnson LPN

## 2019-12-12 ENCOUNTER — PRENATAL OFFICE VISIT (OUTPATIENT)
Dept: OBGYN | Facility: CLINIC | Age: 34
End: 2019-12-12
Payer: COMMERCIAL

## 2019-12-12 VITALS — SYSTOLIC BLOOD PRESSURE: 132 MMHG | BODY MASS INDEX: 47.77 KG/M2 | WEIGHT: 293 LBS | DIASTOLIC BLOOD PRESSURE: 80 MMHG

## 2019-12-12 DIAGNOSIS — O09.90 SUPERVISION OF HIGH RISK PREGNANCY, ANTEPARTUM: Primary | ICD-10-CM

## 2019-12-12 PROCEDURE — 99207 ZZC PRENATAL VISIT: CPT | Performed by: OBSTETRICS & GYNECOLOGY

## 2019-12-12 PROCEDURE — 87653 STREP B DNA AMP PROBE: CPT | Performed by: OBSTETRICS & GYNECOLOGY

## 2019-12-12 PROCEDURE — 96372 THER/PROPH/DIAG INJ SC/IM: CPT | Performed by: OBSTETRICS & GYNECOLOGY

## 2019-12-13 ENCOUNTER — HOSPITAL ENCOUNTER (OUTPATIENT)
Facility: CLINIC | Age: 34
Setting detail: OBSERVATION
Discharge: HOME | DRG: 833 | End: 2019-12-15
Attending: OBSTETRICS & GYNECOLOGY | Admitting: OBSTETRICS & GYNECOLOGY
Payer: COMMERCIAL

## 2019-12-13 ENCOUNTER — TELEPHONE (OUTPATIENT)
Dept: OBGYN | Facility: CLINIC | Age: 34
End: 2019-12-13

## 2019-12-13 ENCOUNTER — HOSPITAL ENCOUNTER (OUTPATIENT)
Dept: ULTRASOUND IMAGING | Facility: CLINIC | Age: 34
Discharge: HOME | DRG: 833 | End: 2019-12-13
Attending: OBSTETRICS & GYNECOLOGY | Admitting: OBSTETRICS & GYNECOLOGY
Payer: COMMERCIAL

## 2019-12-13 ENCOUNTER — OFFICE VISIT (OUTPATIENT)
Dept: MATERNAL FETAL MEDICINE | Facility: CLINIC | Age: 34
DRG: 833 | End: 2019-12-13
Attending: OBSTETRICS & GYNECOLOGY
Payer: COMMERCIAL

## 2019-12-13 DIAGNOSIS — O99.213 OBESITY AFFECTING PREGNANCY IN THIRD TRIMESTER: Primary | ICD-10-CM

## 2019-12-13 DIAGNOSIS — O36.63X0: ICD-10-CM

## 2019-12-13 LAB
ABO + RH BLD: NORMAL
ABO + RH BLD: NORMAL
GP B STREP DNA SPEC QL NAA+PROBE: NEGATIVE
SPECIMEN EXP DATE BLD: NORMAL
SPECIMEN SOURCE: NORMAL

## 2019-12-13 PROCEDURE — 86901 BLOOD TYPING SEROLOGIC RH(D): CPT | Performed by: OBSTETRICS & GYNECOLOGY

## 2019-12-13 PROCEDURE — 12000000 ZZH R&B MED SURG/OB

## 2019-12-13 PROCEDURE — 76819 FETAL BIOPHYS PROFIL W/O NST: CPT | Performed by: OBSTETRICS & GYNECOLOGY

## 2019-12-13 PROCEDURE — 25000132 ZZH RX MED GY IP 250 OP 250 PS 637: Performed by: OBSTETRICS & GYNECOLOGY

## 2019-12-13 PROCEDURE — 76816 OB US FOLLOW-UP PER FETUS: CPT

## 2019-12-13 PROCEDURE — G0378 HOSPITAL OBSERVATION PER HR: HCPCS

## 2019-12-13 PROCEDURE — 36415 COLL VENOUS BLD VENIPUNCTURE: CPT | Performed by: OBSTETRICS & GYNECOLOGY

## 2019-12-13 PROCEDURE — 86780 TREPONEMA PALLIDUM: CPT | Performed by: OBSTETRICS & GYNECOLOGY

## 2019-12-13 PROCEDURE — 86900 BLOOD TYPING SEROLOGIC ABO: CPT | Performed by: OBSTETRICS & GYNECOLOGY

## 2019-12-13 RX ORDER — MISOPROSTOL 100 UG/1
25 TABLET ORAL
Status: DISCONTINUED | OUTPATIENT
Start: 2019-12-13 | End: 2019-12-14

## 2019-12-13 RX ORDER — CARBOPROST TROMETHAMINE 250 UG/ML
250 INJECTION, SOLUTION INTRAMUSCULAR
Status: DISCONTINUED | OUTPATIENT
Start: 2019-12-13 | End: 2019-12-15 | Stop reason: HOSPADM

## 2019-12-13 RX ORDER — TERBUTALINE SULFATE 1 MG/ML
0.25 INJECTION, SOLUTION SUBCUTANEOUS
Status: DISCONTINUED | OUTPATIENT
Start: 2019-12-13 | End: 2019-12-14

## 2019-12-13 RX ORDER — NALOXONE HYDROCHLORIDE 0.4 MG/ML
.1-.4 INJECTION, SOLUTION INTRAMUSCULAR; INTRAVENOUS; SUBCUTANEOUS
Status: DISCONTINUED | OUTPATIENT
Start: 2019-12-13 | End: 2019-12-15 | Stop reason: HOSPADM

## 2019-12-13 RX ORDER — PROCHLORPERAZINE 25 MG
25 SUPPOSITORY, RECTAL RECTAL EVERY 12 HOURS PRN
Status: DISCONTINUED | OUTPATIENT
Start: 2019-12-13 | End: 2019-12-15 | Stop reason: HOSPADM

## 2019-12-13 RX ORDER — METHYLERGONOVINE MALEATE 0.2 MG/ML
200 INJECTION INTRAVENOUS
Status: DISCONTINUED | OUTPATIENT
Start: 2019-12-13 | End: 2019-12-15 | Stop reason: HOSPADM

## 2019-12-13 RX ORDER — PENICILLIN G POTASSIUM 5000000 [IU]/1
5 INJECTION, POWDER, FOR SOLUTION INTRAMUSCULAR; INTRAVENOUS ONCE
Status: DISCONTINUED | OUTPATIENT
Start: 2019-12-13 | End: 2019-12-14

## 2019-12-13 RX ORDER — IBUPROFEN 800 MG/1
800 TABLET, FILM COATED ORAL
Status: DISCONTINUED | OUTPATIENT
Start: 2019-12-13 | End: 2019-12-15 | Stop reason: HOSPADM

## 2019-12-13 RX ORDER — SODIUM CHLORIDE, SODIUM LACTATE, POTASSIUM CHLORIDE, CALCIUM CHLORIDE 600; 310; 30; 20 MG/100ML; MG/100ML; MG/100ML; MG/100ML
INJECTION, SOLUTION INTRAVENOUS CONTINUOUS
Status: DISCONTINUED | OUTPATIENT
Start: 2019-12-13 | End: 2019-12-15 | Stop reason: HOSPADM

## 2019-12-13 RX ORDER — ACETAMINOPHEN 325 MG/1
650 TABLET ORAL EVERY 4 HOURS PRN
Status: DISCONTINUED | OUTPATIENT
Start: 2019-12-13 | End: 2019-12-15 | Stop reason: HOSPADM

## 2019-12-13 RX ORDER — FENTANYL CITRATE 50 UG/ML
50-100 INJECTION, SOLUTION INTRAMUSCULAR; INTRAVENOUS
Status: DISCONTINUED | OUTPATIENT
Start: 2019-12-13 | End: 2019-12-15 | Stop reason: HOSPADM

## 2019-12-13 RX ORDER — OXYCODONE AND ACETAMINOPHEN 5; 325 MG/1; MG/1
1 TABLET ORAL
Status: DISCONTINUED | OUTPATIENT
Start: 2019-12-13 | End: 2019-12-15 | Stop reason: HOSPADM

## 2019-12-13 RX ORDER — OXYTOCIN/0.9 % SODIUM CHLORIDE 30/500 ML
100-340 PLASTIC BAG, INJECTION (ML) INTRAVENOUS CONTINUOUS PRN
Status: DISCONTINUED | OUTPATIENT
Start: 2019-12-13 | End: 2019-12-15 | Stop reason: HOSPADM

## 2019-12-13 RX ORDER — OXYTOCIN 10 [USP'U]/ML
10 INJECTION, SOLUTION INTRAMUSCULAR; INTRAVENOUS
Status: DISCONTINUED | OUTPATIENT
Start: 2019-12-13 | End: 2019-12-15 | Stop reason: HOSPADM

## 2019-12-13 RX ORDER — ONDANSETRON 2 MG/ML
4 INJECTION INTRAMUSCULAR; INTRAVENOUS EVERY 6 HOURS PRN
Status: DISCONTINUED | OUTPATIENT
Start: 2019-12-13 | End: 2019-12-15 | Stop reason: HOSPADM

## 2019-12-13 RX ORDER — METOCLOPRAMIDE HYDROCHLORIDE 5 MG/ML
10 INJECTION INTRAMUSCULAR; INTRAVENOUS EVERY 6 HOURS PRN
Status: DISCONTINUED | OUTPATIENT
Start: 2019-12-13 | End: 2019-12-15 | Stop reason: HOSPADM

## 2019-12-13 RX ADMIN — Medication 25 MCG: at 21:23

## 2019-12-13 RX ADMIN — Medication 25 MCG: at 23:29

## 2019-12-13 NOTE — TELEPHONE ENCOUNTER
Induction smart phrase   Procedure: Cervical Ripening  Date: 12/13/2019  Time: 730pm  Hospital: [facility]  Orders faxed and the patient was advised to call [facility] [phone] labor and delivery department one hour prior to arrival. Beth Johnson LPN

## 2019-12-13 NOTE — TELEPHONE ENCOUNTER
Pt calling to  cervical ripening for tonight. With induction tomorrow.     Please call pt with information.     Sindhu RAMOS RN

## 2019-12-14 LAB — T PALLIDUM AB SER QL: NONREACTIVE

## 2019-12-14 PROCEDURE — 25000132 ZZH RX MED GY IP 250 OP 250 PS 637: Performed by: OBSTETRICS & GYNECOLOGY

## 2019-12-14 PROCEDURE — 25800030 ZZH RX IP 258 OP 636: Performed by: OBSTETRICS & GYNECOLOGY

## 2019-12-14 PROCEDURE — 25000125 ZZHC RX 250: Performed by: OBSTETRICS & GYNECOLOGY

## 2019-12-14 PROCEDURE — 12000000 ZZH R&B MED SURG/OB

## 2019-12-14 PROCEDURE — G0378 HOSPITAL OBSERVATION PER HR: HCPCS

## 2019-12-14 PROCEDURE — 96365 THER/PROPH/DIAG IV INF INIT: CPT

## 2019-12-14 PROCEDURE — 96366 THER/PROPH/DIAG IV INF ADDON: CPT

## 2019-12-14 RX ORDER — OXYTOCIN/0.9 % SODIUM CHLORIDE 30/500 ML
1-24 PLASTIC BAG, INJECTION (ML) INTRAVENOUS CONTINUOUS
Status: DISCONTINUED | OUTPATIENT
Start: 2019-12-14 | End: 2019-12-15 | Stop reason: HOSPADM

## 2019-12-14 RX ADMIN — SODIUM CHLORIDE, POTASSIUM CHLORIDE, SODIUM LACTATE AND CALCIUM CHLORIDE 1000 ML: 600; 310; 30; 20 INJECTION, SOLUTION INTRAVENOUS at 18:17

## 2019-12-14 RX ADMIN — Medication 25 MCG: at 07:36

## 2019-12-14 RX ADMIN — Medication 25 MCG: at 01:20

## 2019-12-14 RX ADMIN — Medication 25 MCG: at 05:29

## 2019-12-14 RX ADMIN — Medication 25 MCG: at 03:27

## 2019-12-14 RX ADMIN — Medication 25 MCG: at 09:34

## 2019-12-14 RX ADMIN — Medication 1 MILLI-UNITS/MIN: at 18:15

## 2019-12-14 NOTE — PROVIDER NOTIFICATION
12/13/19 2030   Provider Notification   Provider Name/Title Dr. Fleming   Method of Notification In Department   Request Evaluate - Remote   Notification Reason Patient Arrived;SVE;Uterine Activity     MD updated on patient arrival and SVE.  No change since SVE by MD in the clinic on 12/12/19.  Category I strip.  Patient is flora, but she is unable to feel any of them.  MD would like PO cytotec for 12 doses every 2 hours.

## 2019-12-14 NOTE — PROVIDER NOTIFICATION
12/14/19 0913   Provider Notification   Provider Name/Title Dr. Pink   Method of Notification At Bedside   MD at bedside to review plan. SVE 2. Patient to receive 1 dose of cytotec at 0930. Re check SVE at 1130

## 2019-12-14 NOTE — H&P
[facility]    History and Physical  Obstetrics and Gynecology     Date of Admission:  2019    Assessment & Plan   Yudelka Clarke is a 34 year old female who presents with macrosomia, borderline polyhydramnios, possible thickening of the fetal interventricular septum, maternal BMI >45. [facility] has recommended delivery.    ASSESSMENT:   IUP @ 36w2d for induction of labor.  NST reactive.  Category  I    PLAN:   Admit - see IP orders  cervical ripening with misoprostol  Anticipate     Pao Fleming MD    History of Present Illness   Yudelka Clarke is a 34 year old female  36w2d  Estimated Date of Delivery: 20 is calculated from Patient's last menstrual period was 2019 (exact date). is [facility]  for induction of labor.    PRENATAL COURSE  Prenatal course was complicated by a history of  delivery. She was treated with [place] through 36 weeks. She passed her 1 hr GCT, but now has macrosomia and borderline poly. MFM is recommended delivery.      Recent Labs   Lab Test 19  0851   ABO A A   RH Pos Pos   AS  --  Neg     Rhogam not indicated   Recent Labs   Lab Test 19  0915 19  0852   HEPBANG  --  Nonreactive   HIAGAB  --  Nonreactive   GBS Negative  --    RUQIGG  --  16       Past Medical History    I have reviewed this patient's medical history and updated it with pertinent information if needed.   Past Medical History:   Diagnosis Date     Blood type A+      Left forearm fracture     3 years old - cast     Right forearm fracture     4 years old - cast       Past Surgical History   I have reviewed this patient's surgical history and updated it with pertinent information if needed.  Past Surgical History:   Procedure Laterality Date     HC TOOTH EXTRACTION W/FORCEP       OSTEOTOMY SAGITTAL SPLIT N/A 2016    jaw surgery for aligment of teeth       Prior to Admission Medications   Prior to Admission Medications    Prescriptions Last Dose Informant Patient Reported? Taking?   HYDROXYprogesterone caproate (CORNELIA) subcutaneous injection 12/12/2019  No No   Sig: Inject 1.1 mLs (275 mg) Subcutaneous every 7 days   Prenatal Vit-Fe Fumarate-FA (PRENATAL PO) 12/13/2019 at 0800  Yes Yes   acetaminophen (TYLENOL) 325 MG tablet 12/11/2019  No No   Sig: Take 2 tablets (650 mg) by mouth every 4 hours as needed for mild pain or fever (greater than or equal to 38  C /100.4  F (oral) or 38.5  C/ 101.4  F (core).)      Facility-Administered Medications: None     Allergies   No Known Allergies    Social History   I have reviewed this patient's social history and updated it with pertinent information if needed. Yudelka Clarke  reports that she has never smoked. She has never used smokeless tobacco. She reports that she does not drink alcohol or use drugs.    Family History   I have reviewed this patient's family history and updated it with pertinent information if needed.   Family History   Problem Relation Age of Onset     Diabetes Maternal Grandmother      Coronary Artery Disease Maternal Grandfather      Thyroid Cancer Mother 45     Sleep Apnea Father      Diabetes Maternal Aunt        Immunization History   Immunizations are up to date    Physical Exam   Temp: 98.3  F (36.8  C) Temp src: Oral BP: 137/78     Resp: 16        Vital Signs with Ranges  Temp:  [98.3  F (36.8  C)] 98.3  F (36.8  C)  Resp:  [16] 16  BP: (131-137)/(78-91) 137/78    Abdomen: gravid, single vertex fetus, non-tender, EFW 8 lbs 8  Cervical Exam: 1/ 50/ Mid/ soft/ -3     Fetal Heart Tones: normal baseline, moderate variablility, + accels, no decels and Category I  TOCO:   Irregular, not feeling any contractions     Constitutional: healthy, alert and active   Respiratory: no increased work of breathing  Cardiovascular: 1+ edema  Skin/Extremites: no bruising or bleeding and normal skin color, texture, turgor  Neurologic: Awake, alert, oriented to name, place and  time.  Cranial nerves II-XII are grossly intact.  Motor is 5 out of 5 bilaterally.  Cerebellar finger to nose, heel to shin intact.  Sensory is intact.  Babinski down going, Romberg negative, and gait is normal.  Neuropsychiatric: General: normal, calm and normal eye contact  Level of consciousness: alert / normal  Affect: normal, pleasant and full

## 2019-12-14 NOTE — PROVIDER NOTIFICATION
12/14/19 1723   Provider Notification   Provider Name/Title Dr Pink   Method of Notification Phone   Request Evaluate - Remote   Notification Reason Uterine Activity;Pain;SVE   dr pink updated re; ve ( balloon out) FHTs, UC's and pain level. If UC's space to more than 3 min apart, start pitocin. Recheck VE at 2000 and update dr osmani murray/charley

## 2019-12-14 NOTE — PROGRESS NOTES
Given scant cervical change over the course of the morning, recommending cook catheter for mechanical dilation. Placed with 80cc NS in the intrauterine balloon and 60 ml NS in the vaginal balloon. Plan to tug on cook every 2 hours. Initiate pitocin in 6 hours if balloon still in place. Plan to remove after 12 hours in place and continue pitocin augmentation at that time.     Jenna Pink MD

## 2019-12-14 NOTE — PROGRESS NOTES
Intrapartum progress note:    S: Patient resting comfortably, feeling mild contractions.     O:   Vitals:    19 2345 19 0530 19 0730   BP: 137/78 132/65 93/55 120/69   BP Location:       Resp:  18 15 18   Temp:  98  F (36.7  C) 97.8  F (36.6  C) 98.5  F (36.9  C)   TempSrc:  Oral Oral Oral      Gen: resting, in NAD  Cx: 2/40/-3  FHT: baseline 135, moderate variability, + accels, no decels  [place]: Q 2 min    A/P: Yudelka Clarke is a 34 year old female  at 36w3d here for IOL for polyhydramnios, macrosomia, and thickening of fetal interventricular septum.  - FWB: Cat I tracing, reactive  - IOL: s/p 7 doses of cytotec, will plan 1-2 more and initiate pitocin. Consider needling the amniotic sac once flora regularly   - Analgesia: when appropriate  - Anticipate   - plan lovenox 40mg subcutaneous PP while in house.     Jenna Pink MD 2019 11:12 AM

## 2019-12-15 ENCOUNTER — ANESTHESIA EVENT (OUTPATIENT)
Dept: OBGYN | Facility: CLINIC | Age: 34
End: 2019-12-15
Payer: COMMERCIAL

## 2019-12-15 ENCOUNTER — ANESTHESIA (OUTPATIENT)
Dept: OBGYN | Facility: CLINIC | Age: 34
End: 2019-12-15
Payer: COMMERCIAL

## 2019-12-15 VITALS
TEMPERATURE: 98.1 F | HEART RATE: 85 BPM | SYSTOLIC BLOOD PRESSURE: 130 MMHG | DIASTOLIC BLOOD PRESSURE: 83 MMHG | RESPIRATION RATE: 16 BRPM

## 2019-12-15 PROCEDURE — 37000011 ZZH ANESTHESIA WARD SERVICE

## 2019-12-15 PROCEDURE — 40000671 ZZH STATISTIC ANESTHESIA CASE

## 2019-12-15 PROCEDURE — 25800030 ZZH RX IP 258 OP 636: Performed by: OBSTETRICS & GYNECOLOGY

## 2019-12-15 PROCEDURE — G0378 HOSPITAL OBSERVATION PER HR: HCPCS

## 2019-12-15 PROCEDURE — 96366 THER/PROPH/DIAG IV INF ADDON: CPT

## 2019-12-15 PROCEDURE — 25000132 ZZH RX MED GY IP 250 OP 250 PS 637: Performed by: OBSTETRICS & GYNECOLOGY

## 2019-12-15 RX ORDER — EPHEDRINE SULFATE 50 MG/ML
5 INJECTION, SOLUTION INTRAMUSCULAR; INTRAVENOUS; SUBCUTANEOUS
Status: DISCONTINUED | OUTPATIENT
Start: 2019-12-15 | End: 2019-12-15 | Stop reason: HOSPADM

## 2019-12-15 RX ORDER — NALBUPHINE HYDROCHLORIDE 10 MG/ML
2.5-5 INJECTION, SOLUTION INTRAMUSCULAR; INTRAVENOUS; SUBCUTANEOUS EVERY 6 HOURS PRN
Status: DISCONTINUED | OUTPATIENT
Start: 2019-12-15 | End: 2019-12-15 | Stop reason: HOSPADM

## 2019-12-15 RX ORDER — MISOPROSTOL 100 UG/1
25 TABLET ORAL
Status: COMPLETED | OUTPATIENT
Start: 2019-12-15 | End: 2019-12-15

## 2019-12-15 RX ORDER — NALOXONE HYDROCHLORIDE 0.4 MG/ML
.1-.4 INJECTION, SOLUTION INTRAMUSCULAR; INTRAVENOUS; SUBCUTANEOUS
Status: DISCONTINUED | OUTPATIENT
Start: 2019-12-15 | End: 2019-12-15 | Stop reason: HOSPADM

## 2019-12-15 RX ADMIN — SODIUM CHLORIDE, POTASSIUM CHLORIDE, SODIUM LACTATE AND CALCIUM CHLORIDE: 600; 310; 30; 20 INJECTION, SOLUTION INTRAVENOUS at 02:46

## 2019-12-15 RX ADMIN — Medication 25 MCG: at 07:05

## 2019-12-15 RX ADMIN — Medication 25 MCG: at 05:16

## 2019-12-15 NOTE — PROVIDER NOTIFICATION
12/15/19 0352   Provider Notification   Provider Name/Title Brown   Method of Notification Phone   Request Evaluate - Remote   Notification Reason Labor Status   MD notified that pitocin at 23 mu/min and patient sleeping comfortably. Orders received to discontinue pit and do 2 doses of oral cytotec

## 2019-12-15 NOTE — PROVIDER NOTIFICATION
12/15/19 0910   Provider Notification   Provider Name/Title Mark-Svitlana   Method of Notification Phone   Request Evaluate - Remote   Notification Reason Status Update   ok for pt to eat and be off the monitor for now. Is enroute to unit now to re-evaluate

## 2019-12-15 NOTE — DISCHARGE SUMMARY
[facility] Discharge Summary    Yudelka Clarke MRN# [id number]   Age: 34 year old YOB: 1985     Date of Admission:  2019  Date of Discharge::  12/15/2019   Admitting Physician:  Pao Fleming MD  Discharge Physician:  Maritza Hardy MD           Admission Diagnoses:   1. 34 year old  at 36w2d   2. Fetal macrosomia with accelerated interval growth  3. Borderline polyhydramnios  4. BMI >45          Discharge Diagnosis:     1. 34 year old  at 36w4d   2. Failed induction of labor           Procedures:     Procedure(s):  medical and mechanical cervical dilation, pitocin induction to 24 mU/min, serial maternal and fetal monitoring            Medications Prior to Admission:     No medications prior to admission.             Discharge Medications:     Discharge Medication List as of 12/15/2019 10:27 AM      CONTINUE these medications which have NOT CHANGED    Details   acetaminophen (TYLENOL) 325 MG tablet Take 2 tablets (650 mg) by mouth every 4 hours as needed for mild pain or fever (greater than or equal to 38  C /100.4  F (oral) or 38.5  C/ 101.4  F (core).), Disp-100 tablet, R-0, OTC      HYDROXYprogesterone caproate (CORNELIA) subcutaneous injection Inject 1.1 mLs (275 mg) Subcutaneous every 7 days, Disp-4.4 mL, R-3, E-Prescribe      Prenatal Vit-Fe Fumarate-FA (PRENATAL PO) Historical                   Consultations:   No consultations were requested during this admission          Brief History of Illness:   Yudelka Clarke is a 34 year old female who presents with macrosomia, borderline polyhydramnios, possible thickening of the fetal interventricular septum, maternal BMI >45. [facility] has recommended delivery by 37 weeks gestation.     ASSESSMENT:   IUP @ 36w2d for induction of labor.  NST reactive.  Category  I     PLAN:   Admit - see IP orders  cervical ripening with misoprostol  Anticipate            Hospital Course:   She was given po cytotec, a cook  catheter, pitocin up to 24 mU/min, followed by more po cytotec over the course of three days.  The fetal head remained ballotable and she was never uncomfortable or in a good contraction pattern for labor.  Fetal category 1 FHT throughout the stay.  She was offered cervidil versus discharge home with close follow up, she elected for discharge home and will return for second attempt at IOL tomorrow morning.            Discharge Instructions and Follow-Up:     Discharge diet: Regular   Discharge activity: Activity as tolerated   Discharge follow-up: IOL scheduled tomorrow 12/16 at 0730               Discharge Disposition:     Discharged to home      Maritza Hardy MD, MPH  [facility]

## 2019-12-15 NOTE — PLAN OF CARE
VSS. Patient continued on pitocin until 23 mu/min was reached. Patient remained comfortable throughout. SVE showed no cervical change. MD notified. Pitocin discontinued and oral cytotec administered x2 doses.

## 2019-12-15 NOTE — PROGRESS NOTES
Data: Patient assessed/failed induction in the [facility] for >2days.  Cervical exam posterior, dilated to 4 and thick, baby is ballotable.  Membranes intact.  Contractions every 5-7 min but pt is unaware of most of them on discharge, denies pain.  Action:  Presumed adequate fetal oxygenation documented (see flow record). Discharge instructions reviewed.  Patient instructed to report change in fetal movement, vaginal leaking of fluid or bleeding, abdominal pain, or any concerns related to the pregnancy to her nurse/physician.    Response: Orders to discharge home per Dr Vicente.  Patient verbalized understanding of education and verbalized agreement with plan. Discharged to home at 1035. Pt will call at 0630 in the morning to verify arrival time. Plans to return at 0730 to begin induction process, will evaluate at that time ripening versus oxytocin

## 2019-12-15 NOTE — PLAN OF CARE
Reviewed with pt and  that the physician is enroute to re-evaluate. Pt denies pain, is unaware of the uc's she is having, feels baby moving. Monitors off, pt is ordering breakfast.  at bedside. Questions answered. Many questions regarding possible options for plan of care from here forward.

## 2019-12-15 NOTE — DISCHARGE INSTRUCTIONS
Discharge Instruction for Undelivered Patients      You were seen for: Induction/failed  We Consulted: Dr. Fleming/Joesph/Jules  You had (Test or Medicine):Cytotec x 9 doses and oxytocin, monitoring     Diet:   Drink 8 to 12 glasses of liquids (milk, juice, water) every day.  You may eat meals and snacks.     Activity:  Call your doctor or nurse midwife if your baby is moving less than usual.     Call your provider if you notice:  Swelling in your face or increased swelling in your hands or legs.  Headaches that are not relieved by Tylenol (acetaminophen).  Changes in your vision (blurring: seeing spots or stars.)  Nausea (sick to your stomach) and vomiting (throwing up).   Weight gain of 5 pounds or more per week.  Heartburn that doesn't go away.  Signs of bladder infection: pain when you urinate (use the toilet), need to go more often and more urgently.  The bag of ruano (rupture of membranes) breaks, or you notice leaking in your underwear.  Bright red blood in your underwear.  Uterine contractions every 5 minutes x 1 hour and increasing in pain ( stronger than what you have experienced the last 2 days)  Increase or change in vaginal discharge (note the color and amount)  Other: .    Follow-up:  return to L/D tomorrow morning between 0730-800am. call at 0630 to verify arrival time

## 2019-12-15 NOTE — PROVIDER NOTIFICATION
12/14/19 1932   Provider Notification   Provider Name/Title Brown   Method of Notification Phone   Request Evaluate - Remote   Notification Reason Labor Status   MD updated with GBS results and PCN discontinued. Verified plan of care and orders received that patient may have pain control whenever she desires it.

## 2019-12-16 ENCOUNTER — HOSPITAL ENCOUNTER (INPATIENT)
Facility: CLINIC | Age: 34
LOS: 2 days | Discharge: HOME | End: 2019-12-18
Attending: OBSTETRICS & GYNECOLOGY | Admitting: OBSTETRICS & GYNECOLOGY
Payer: COMMERCIAL

## 2019-12-16 LAB
ABO + RH BLD: NORMAL
ABO + RH BLD: NORMAL
HGB BLD-MCNC: 11.6 G/DL (ref 11.7–15.7)
SPECIMEN EXP DATE BLD: NORMAL
T PALLIDUM AB SER QL: NONREACTIVE

## 2019-12-16 PROCEDURE — 72200001 ZZH LABOR CARE VAGINAL DELIVERY SINGLE

## 2019-12-16 PROCEDURE — 86900 BLOOD TYPING SEROLOGIC ABO: CPT | Performed by: OBSTETRICS & GYNECOLOGY

## 2019-12-16 PROCEDURE — 10907ZC DRAINAGE OF AMNIOTIC FLUID, THERAPEUTIC FROM PRODUCTS OF CONCEPTION, VIA NATURAL OR ARTIFICIAL OPENING: ICD-10-PCS | Performed by: OBSTETRICS & GYNECOLOGY

## 2019-12-16 PROCEDURE — 00HU33Z INSERTION OF INFUSION DEVICE INTO SPINAL CANAL, PERCUTANEOUS APPROACH: ICD-10-PCS | Performed by: ANESTHESIOLOGY

## 2019-12-16 PROCEDURE — 37000011 ZZH ANESTHESIA WARD SERVICE

## 2019-12-16 PROCEDURE — 25000125 ZZHC RX 250: Performed by: ANESTHESIOLOGY

## 2019-12-16 PROCEDURE — 25000132 ZZH RX MED GY IP 250 OP 250 PS 637: Performed by: OBSTETRICS & GYNECOLOGY

## 2019-12-16 PROCEDURE — 3E033VJ INTRODUCTION OF OTHER HORMONE INTO PERIPHERAL VEIN, PERCUTANEOUS APPROACH: ICD-10-PCS | Performed by: OBSTETRICS & GYNECOLOGY

## 2019-12-16 PROCEDURE — 3E0R3BZ INTRODUCTION OF ANESTHETIC AGENT INTO SPINAL CANAL, PERCUTANEOUS APPROACH: ICD-10-PCS | Performed by: ANESTHESIOLOGY

## 2019-12-16 PROCEDURE — 36415 COLL VENOUS BLD VENIPUNCTURE: CPT | Performed by: OBSTETRICS & GYNECOLOGY

## 2019-12-16 PROCEDURE — 25000125 ZZHC RX 250: Performed by: OBSTETRICS & GYNECOLOGY

## 2019-12-16 PROCEDURE — 59400 OBSTETRICAL CARE: CPT | Performed by: OBSTETRICS & GYNECOLOGY

## 2019-12-16 PROCEDURE — 25000128 H RX IP 250 OP 636: Performed by: ANESTHESIOLOGY

## 2019-12-16 PROCEDURE — 25800030 ZZH RX IP 258 OP 636: Performed by: OBSTETRICS & GYNECOLOGY

## 2019-12-16 PROCEDURE — 86901 BLOOD TYPING SEROLOGIC RH(D): CPT | Performed by: OBSTETRICS & GYNECOLOGY

## 2019-12-16 PROCEDURE — 86780 TREPONEMA PALLIDUM: CPT | Performed by: OBSTETRICS & GYNECOLOGY

## 2019-12-16 PROCEDURE — 85018 HEMOGLOBIN: CPT | Performed by: OBSTETRICS & GYNECOLOGY

## 2019-12-16 PROCEDURE — 12000000 ZZH R&B MED SURG/OB

## 2019-12-16 RX ORDER — BISACODYL 10 MG
10 SUPPOSITORY, RECTAL RECTAL DAILY PRN
Status: DISCONTINUED | OUTPATIENT
Start: 2019-12-18 | End: 2019-12-18 | Stop reason: HOSPADM

## 2019-12-16 RX ORDER — ACETAMINOPHEN 325 MG/1
650 TABLET ORAL EVERY 4 HOURS PRN
Status: DISCONTINUED | OUTPATIENT
Start: 2019-12-16 | End: 2019-12-16

## 2019-12-16 RX ORDER — OXYTOCIN 10 [USP'U]/ML
10 INJECTION, SOLUTION INTRAMUSCULAR; INTRAVENOUS
Status: DISCONTINUED | OUTPATIENT
Start: 2019-12-16 | End: 2019-12-18 | Stop reason: HOSPADM

## 2019-12-16 RX ORDER — OXYCODONE AND ACETAMINOPHEN 5; 325 MG/1; MG/1
1 TABLET ORAL
Status: DISCONTINUED | OUTPATIENT
Start: 2019-12-16 | End: 2019-12-16

## 2019-12-16 RX ORDER — OXYTOCIN/0.9 % SODIUM CHLORIDE 30/500 ML
1-24 PLASTIC BAG, INJECTION (ML) INTRAVENOUS CONTINUOUS
Status: DISCONTINUED | OUTPATIENT
Start: 2019-12-16 | End: 2019-12-17 | Stop reason: CLARIF

## 2019-12-16 RX ORDER — NALOXONE HYDROCHLORIDE 0.4 MG/ML
.1-.4 INJECTION, SOLUTION INTRAMUSCULAR; INTRAVENOUS; SUBCUTANEOUS
Status: DISCONTINUED | OUTPATIENT
Start: 2019-12-16 | End: 2019-12-16

## 2019-12-16 RX ORDER — HYDROCORTISONE 2.5 %
CREAM (GRAM) TOPICAL 3 TIMES DAILY PRN
Status: DISCONTINUED | OUTPATIENT
Start: 2019-12-16 | End: 2019-12-18 | Stop reason: HOSPADM

## 2019-12-16 RX ORDER — IBUPROFEN 800 MG/1
800 TABLET, FILM COATED ORAL
Status: DISCONTINUED | OUTPATIENT
Start: 2019-12-16 | End: 2019-12-16

## 2019-12-16 RX ORDER — AMOXICILLIN 250 MG
2 CAPSULE ORAL 2 TIMES DAILY
Status: DISCONTINUED | OUTPATIENT
Start: 2019-12-16 | End: 2019-12-18 | Stop reason: HOSPADM

## 2019-12-16 RX ORDER — ONDANSETRON 2 MG/ML
4 INJECTION INTRAMUSCULAR; INTRAVENOUS EVERY 6 HOURS PRN
Status: DISCONTINUED | OUTPATIENT
Start: 2019-12-16 | End: 2019-12-17 | Stop reason: CLARIF

## 2019-12-16 RX ORDER — OXYTOCIN/0.9 % SODIUM CHLORIDE 30/500 ML
100 PLASTIC BAG, INJECTION (ML) INTRAVENOUS CONTINUOUS
Status: DISCONTINUED | OUTPATIENT
Start: 2019-12-16 | End: 2019-12-18 | Stop reason: HOSPADM

## 2019-12-16 RX ORDER — NALOXONE HYDROCHLORIDE 0.4 MG/ML
.1-.4 INJECTION, SOLUTION INTRAMUSCULAR; INTRAVENOUS; SUBCUTANEOUS
Status: DISCONTINUED | OUTPATIENT
Start: 2019-12-16 | End: 2019-12-17 | Stop reason: CLARIF

## 2019-12-16 RX ORDER — LANOLIN 100 %
OINTMENT (GRAM) TOPICAL
Status: DISCONTINUED | OUTPATIENT
Start: 2019-12-16 | End: 2019-12-18 | Stop reason: HOSPADM

## 2019-12-16 RX ORDER — METHYLERGONOVINE MALEATE 0.2 MG/ML
200 INJECTION INTRAVENOUS
Status: DISCONTINUED | OUTPATIENT
Start: 2019-12-16 | End: 2019-12-17 | Stop reason: CLARIF

## 2019-12-16 RX ORDER — NALBUPHINE HYDROCHLORIDE 10 MG/ML
2.5-5 INJECTION, SOLUTION INTRAMUSCULAR; INTRAVENOUS; SUBCUTANEOUS EVERY 6 HOURS PRN
Status: DISCONTINUED | OUTPATIENT
Start: 2019-12-16 | End: 2019-12-17 | Stop reason: CLARIF

## 2019-12-16 RX ORDER — CARBOPROST TROMETHAMINE 250 UG/ML
250 INJECTION, SOLUTION INTRAMUSCULAR
Status: DISCONTINUED | OUTPATIENT
Start: 2019-12-16 | End: 2019-12-17 | Stop reason: CLARIF

## 2019-12-16 RX ORDER — OXYTOCIN/0.9 % SODIUM CHLORIDE 30/500 ML
340 PLASTIC BAG, INJECTION (ML) INTRAVENOUS CONTINUOUS PRN
Status: DISCONTINUED | OUTPATIENT
Start: 2019-12-16 | End: 2019-12-18 | Stop reason: HOSPADM

## 2019-12-16 RX ORDER — ACETAMINOPHEN 325 MG/1
650 TABLET ORAL EVERY 4 HOURS PRN
Status: DISCONTINUED | OUTPATIENT
Start: 2019-12-16 | End: 2019-12-18 | Stop reason: HOSPADM

## 2019-12-16 RX ORDER — OXYTOCIN/0.9 % SODIUM CHLORIDE 30/500 ML
100-340 PLASTIC BAG, INJECTION (ML) INTRAVENOUS CONTINUOUS PRN
Status: COMPLETED | OUTPATIENT
Start: 2019-12-16 | End: 2019-12-16

## 2019-12-16 RX ORDER — NALOXONE HYDROCHLORIDE 0.4 MG/ML
.1-.4 INJECTION, SOLUTION INTRAMUSCULAR; INTRAVENOUS; SUBCUTANEOUS
Status: DISCONTINUED | OUTPATIENT
Start: 2019-12-16 | End: 2019-12-18 | Stop reason: HOSPADM

## 2019-12-16 RX ORDER — LIDOCAINE HYDROCHLORIDE AND EPINEPHRINE 15; 5 MG/ML; UG/ML
INJECTION, SOLUTION EPIDURAL PRN
Status: DISCONTINUED | OUTPATIENT
Start: 2019-12-16 | End: 2019-12-16

## 2019-12-16 RX ORDER — OXYTOCIN 10 [USP'U]/ML
10 INJECTION, SOLUTION INTRAMUSCULAR; INTRAVENOUS
Status: DISCONTINUED | OUTPATIENT
Start: 2019-12-16 | End: 2019-12-17 | Stop reason: CLARIF

## 2019-12-16 RX ORDER — SODIUM CHLORIDE, SODIUM LACTATE, POTASSIUM CHLORIDE, CALCIUM CHLORIDE 600; 310; 30; 20 MG/100ML; MG/100ML; MG/100ML; MG/100ML
INJECTION, SOLUTION INTRAVENOUS CONTINUOUS
Status: DISCONTINUED | OUTPATIENT
Start: 2019-12-16 | End: 2019-12-17 | Stop reason: CLARIF

## 2019-12-16 RX ORDER — AMOXICILLIN 250 MG
1 CAPSULE ORAL 2 TIMES DAILY
Status: DISCONTINUED | OUTPATIENT
Start: 2019-12-16 | End: 2019-12-18 | Stop reason: HOSPADM

## 2019-12-16 RX ORDER — PHENYLEPHRINE HCL IN 0.9% NACL 1 MG/10 ML
100 SYRINGE (ML) INTRAVENOUS EVERY 5 MIN PRN
Status: DISCONTINUED | OUTPATIENT
Start: 2019-12-16 | End: 2019-12-17 | Stop reason: CLARIF

## 2019-12-16 RX ORDER — IBUPROFEN 800 MG/1
800 TABLET, FILM COATED ORAL EVERY 6 HOURS PRN
Status: DISCONTINUED | OUTPATIENT
Start: 2019-12-16 | End: 2019-12-18 | Stop reason: HOSPADM

## 2019-12-16 RX ADMIN — Medication: at 17:57

## 2019-12-16 RX ADMIN — LIDOCAINE HYDROCHLORIDE,EPINEPHRINE BITARTRATE 3 ML: 15; .005 INJECTION, SOLUTION EPIDURAL; INFILTRATION; INTRACAUDAL; PERINEURAL at 17:52

## 2019-12-16 RX ADMIN — Medication 2 MILLI-UNITS/MIN: at 09:44

## 2019-12-16 RX ADMIN — IBUPROFEN 800 MG: 800 TABLET ORAL at 20:12

## 2019-12-16 RX ADMIN — Medication 340 ML/HR: at 19:52

## 2019-12-16 RX ADMIN — SODIUM CHLORIDE, POTASSIUM CHLORIDE, SODIUM LACTATE AND CALCIUM CHLORIDE: 600; 310; 30; 20 INJECTION, SOLUTION INTRAVENOUS at 16:34

## 2019-12-16 RX ADMIN — SODIUM CHLORIDE, POTASSIUM CHLORIDE, SODIUM LACTATE AND CALCIUM CHLORIDE: 600; 310; 30; 20 INJECTION, SOLUTION INTRAVENOUS at 08:30

## 2019-12-16 NOTE — PLAN OF CARE
Data: Patient presented to [facility]: 2019  7:47 AM.  Reason for maternal/fetal assessment is induction of labor. Patient reports good fetal movement.  Patient is a .  Prenatal record reviewed. Pregnancy  has been complicated by polyhydramnios, obesity and macrosomia.  Gestational Age 36w5d. VSS. Fetal movement present. Patient denies uterine contractions, leaking of vaginal fluid/rupture of membranes, vaginal bleeding, abdominal pain, pelvic pressure, nausea, vomiting, headache, visual disturbances, epigastric or URQ pain, significant edema. Support person is present.   Action: Verbal consent for EFM. Triage assessment completed. Bill of rights reviewed.  Response: Patient verbalized agreement with plan. Will contact Dr Tom Dodd with update and further orders.

## 2019-12-16 NOTE — PROGRESS NOTES
Pt on IV pitocin with contractions increasing in frequency.  Pt not uncomfortable  FHT's Cat 1    A/P:  Will continue to increase pit and recheck when better labor pattern achieved

## 2019-12-16 NOTE — PROVIDER NOTIFICATION
12/16/19 0815   Provider Notification   Provider Name/Title Dr Dodd   Method of Notification At Bedside   Request Evaluate in Person   Notification Reason Patient Arrived;SVE

## 2019-12-16 NOTE — PROVIDER NOTIFICATION
12/16/19 1741   Provider Notification   Provider Name/Title Dr Padron   Method of Notification At Bedside   Request Evaluate in Person     MDA at bedside for epidural placement  per pt preference

## 2019-12-16 NOTE — H&P
[facility] Labor and Delivery History and Physical    Yudelka Macedo MRN# [id number]   Age: 34 year old YOB: 1985     Date of Admission:  2019    Primary care provider: Stevo Barros           Chief Complaint:   Yudelka Macedo is a 34 year old female who presents with macrosomia, borderline polyhydramnios, possible thickening of the fetal interventricular septum, maternal BMI >45. [facility] has recommended delivery by 37 weeks gestation. Prenatal course was complicated by a history of  delivery. She was treated with [place] through 36 weeks. She passed her 1 hr GCT, but now has macrosomia and borderline poly.    earlier in this gestation at 36.2 weeks the pt underwent medical and mechanical cervical dilation, pitocin induction to 24 mU/min, serial maternal and fetal monitoring that was unsuccessful.  The pt was discharge home and presents now for management and IOL.  Risks, benefits, and alternative modes of therapy discussed at length. Pathophysiology of the disease process reviewed, all of the patients questions answered and informed consent obtained.  We discussed issues of IOL, fetal macrosomia, shoulder dystocia, elective C/B.  All of the pt's questions have been answered.  She desires to proceed with IOL.  EFW by [facility] U/S 19 3962 g  (8#12oz)       Pregnancy history:     OBSTETRIC HISTORY:    OB History    Para Term  AB Living   2 1 0 1 0 1   SAB TAB Ectopic Multiple Live Births   0 0 0 0 1      # Outcome Date GA Lbr Jose Luis/2nd Weight Sex Delivery Anes PTL Lv   2 Current            1  17 30w0d 00:25 / 00:02 1.58 kg (3 lb 7.7 oz) M Vag-Spont Local Y LISA      Name: ERAN MACEDO      Apgar1: 7  Apgar5: 8       EDC: Estimated Date of Delivery: 20    Prenatal Labs:   Lab Results   Component Value Date    ABO A 2019    RH Pos 2019    AS Neg 2019    HEPBANG Nonreactive 2019    CHPCRT Negative  2019    GCPCRT Negative 2019    TREPAB Negative 04/10/2017    HGB 11.8 10/10/2019       GBS Status:   Lab Results   Component Value Date    GBS Negative 2019       Active Problem List  Patient Active Problem List   Diagnosis     Mandibular hypoplasia     PCOS (polycystic ovarian syndrome)     Morbid obesity (H)     Blood type A+     Obesity in pregnancy     Encounter for triage in pregnant patient     Indication for care in labor or delivery      (spontaneous vaginal delivery)     History of  delivery, currently pregnant       Medication Prior to Admission  Medications Prior to Admission   Medication Sig Dispense Refill Last Dose     acetaminophen (TYLENOL) 325 MG tablet Take 2 tablets (650 mg) by mouth every 4 hours as needed for mild pain or fever (greater than or equal to 38  C /100.4  F (oral) or 38.5  C/ 101.4  F (core).) 100 tablet 0 2019     HYDROXYprogesterone caproate (CORNELIA) subcutaneous injection Inject 1.1 mLs (275 mg) Subcutaneous every 7 days 4.4 mL 3 2019     Prenatal Vit-Fe Fumarate-FA (PRENATAL PO)    2019 at 0800   .        Maternal Past Medical History:     Past Medical History:   Diagnosis Date     Blood type A+      Left forearm fracture     3 years old - cast     Right forearm fracture     4 years old - cast                       Family History:   I have reviewed this patient's family history            Social History:   I have reviewed this patient's social history         Review of Systems:   CONSTITUTIONAL: NEGATIVE for fever, chills, change in weight  ENT/MOUTH: NEGATIVE for ear, mouth and throat problems  RESP: NEGATIVE for significant cough or SOB  CV: NEGATIVE for chest pain, palpitations or peripheral edema          Physical Exam:   Vitals were reviewed  All vitals stable                     Constitutional:   awake, alert, cooperative, no apparent distress, and appears stated age     Eyes:   Lids and lashes normal, pupils equal, round and  reactive to light, extra ocular muscles intact, sclera clear, conjunctiva normal     ENT:   Normocephalic, without obvious abnormality, atraumatic, sinuses nontender on palpation, external ears without lesions, oral pharynx with moist mucous membranes, tonsils without erythema or exudates, gums normal and good dentition.     Neck:   Supple, symmetrical, trachea midline, no adenopathy, thyroid symmetric, not enlarged and no tenderness, skin normal     Hematologic / Lymphatic:   no cervical lymphadenopathy and no supraclavicular lymphadenopathy     Back:   Symmetric, no curvature, spinous processes are non-tender on palpation, paraspinous muscles are non-tender on palpation, no costal vertebral tenderness     Lungs:   No increased work of breathing, good air exchange, clear to auscultation bilaterally, no crackles or wheezing     Cardiovascular:   Normal apical impulse, regular rate and rhythm, normal S1 and S2, no S3 or S4, and no murmur noted     Abdomen:   No scars, normal bowel sounds, soft, gravid uterus barrera infant vtx EFW as above, no masses palpated, no hepatosplenomegally     Genitounirinary:   External Genitalia:  General appearance; normal     Musculoskeletal:   There is no redness, warmth, or swelling of the joints.  Full range of motion noted.  Motor strength is 5 out of 5 all extremities bilaterally.  Tone is normal.      Cervix:   Membranes: AROM  clear amniotic fluid   Dilation: 4   Effacement: 50%   Station:-2   Consistency: average   Position: Mid  Presentation:Cephalic  Fetal Heart Rate Tracing: reactive and reassuring, Tier 1 (normal)  Tocometer: external monitor and inadequate                       Assessment:   Yudelka Clarke is a 36w5d pregnant female admitted with induction of labor, indication macrosomia, borderline polyhydramnios, possible thickening of the fetal interventricular septum, maternal BMI >45. [facility] has recommended delivery by 37 weeks gestation.  Informed consent  obtained          Plan:   Admit - see IP orders  Labor induction with amniotomy and prnPitocin   Anticipate vag del  Pelvimetry feels adequate for this EFW to my exam  NNP for del.  Shoulder dystocia precautions discussed with the pt   Tom Dodd MD

## 2019-12-16 NOTE — PROVIDER NOTIFICATION
12/16/19 1629   Provider Notification   Provider Name/Title Dr Dodd   Method of Notification In Department   Request Evaluate - Remote   Notification Reason Status Update     MD in department and updated on pt status including pitocin level, contraction pattern, FHR, and maternal pain level. MD states no need to check cervix until pt is feeling more uncomfortable.

## 2019-12-17 PROCEDURE — 25000128 H RX IP 250 OP 636: Performed by: OBSTETRICS & GYNECOLOGY

## 2019-12-17 PROCEDURE — 25000132 ZZH RX MED GY IP 250 OP 250 PS 637: Performed by: OBSTETRICS & GYNECOLOGY

## 2019-12-17 PROCEDURE — 12000000 ZZH R&B MED SURG/OB

## 2019-12-17 RX ADMIN — SENNOSIDES AND DOCUSATE SODIUM 1 TABLET: 8.6; 5 TABLET ORAL at 19:45

## 2019-12-17 RX ADMIN — ENOXAPARIN SODIUM 40 MG: 40 INJECTION SUBCUTANEOUS at 12:42

## 2019-12-17 RX ADMIN — IBUPROFEN 800 MG: 800 TABLET ORAL at 13:47

## 2019-12-17 RX ADMIN — IBUPROFEN 800 MG: 800 TABLET ORAL at 02:10

## 2019-12-17 RX ADMIN — IBUPROFEN 800 MG: 800 TABLET ORAL at 19:44

## 2019-12-17 RX ADMIN — IBUPROFEN 800 MG: 800 TABLET ORAL at 08:05

## 2019-12-17 RX ADMIN — SENNOSIDES AND DOCUSATE SODIUM 1 TABLET: 8.6; 5 TABLET ORAL at 08:05

## 2019-12-17 NOTE — ANESTHESIA PREPROCEDURE EVALUATION
Anesthesia Pre-Procedure Evaluation    Patient: Yudelka Clarke   MRN: [id number] : 1985          Preoperative Diagnosis: * No surgery found *        Past Medical History:   Diagnosis Date     Blood type A+      Left forearm fracture     3 years old - cast     Right forearm fracture     4 years old - cast     Past Surgical History:   Procedure Laterality Date     HC TOOTH EXTRACTION W/FORCEP       OSTEOTOMY SAGITTAL SPLIT N/A 2016    jaw surgery for aligment of teeth     Anesthesia Evaluation       history and physical reviewed . Pt has not had prior anesthetic     No history of anesthetic complications          ROS/MED HX    ENT/Pulmonary:  - neg pulmonary ROS   (+)CANDIDO risk factors snores loudly, obese, , . .   (-) asthma   Neurologic:  - neg neurologic ROS   (+)neuropathy - right leg sciatic pain,     Cardiovascular:  - neg cardiovascular ROS      (-) hypertension, syncope and irregular heartbeat/palpitations   METS/Exercise Tolerance:     Hematologic:  - neg hematologic  ROS       Musculoskeletal:  - neg musculoskeletal ROS       GI/Hepatic:  - neg GI/hepatic ROS      (-) GERD   Renal/Genitourinary:  - ROS Renal section negative       Endo:     (+) Obesity, .      Psychiatric:  - neg psychiatric ROS       Infectious Disease:  - neg infectious disease ROS       Malignancy:         Other:                     neg OB ROS            Physical Exam  Normal systems: cardiovascular, pulmonary and dental    Airway   Mallampati: II  TM distance: > 3 FB  Neck ROM: full  Mouth opening: > 3 cm    Dental     Cardiovascular       Pulmonary             Lab Results   Component Value Date    WBC 10.1 10/10/2019    HGB 11.6 (L) 2019    HCT 36.0 10/10/2019     10/10/2019     2016    POTASSIUM 3.8 2016    CHLORIDE 108 2016    CO2 19 (L) 2016    BUN 10 2016    CR 0.57 2016    GLC 89 10/30/2018    GENNA 8.6 2016    ALBUMIN 3.4 2016    PROTTOTAL 7.2  "06/24/2016    ALT 22 06/24/2016    AST 15 06/24/2016    ALKPHOS 78 06/24/2016    BILITOTAL 0.4 06/24/2016    TSH 3.25 10/30/2018    HCG Negative 01/06/2016       Preop Vitals  BP Readings from Last 3 Encounters:   12/16/19 117/56   12/15/19 130/83   12/12/19 132/80    Pulse Readings from Last 3 Encounters:   12/14/19 85   06/21/19 84   03/21/19 88      Resp Readings from Last 3 Encounters:   12/16/19 16   12/15/19 16   04/13/17 18    SpO2 Readings from Last 3 Encounters:   12/16/19 96%   04/10/17 95%   03/31/17 98%      Temp Readings from Last 1 Encounters:   12/16/19 98.6  F (37  C)    Ht Readings from Last 1 Encounters:   07/03/19 1.702 m (5' 7\")      Wt Readings from Last 1 Encounters:   12/12/19 138.3 kg (305 lb)    Estimated body mass index is 47.77 kg/m  as calculated from the following:    Height as of 7/3/19: 1.702 m (5' 7\").    Weight as of 12/12/19: 138.3 kg (305 lb).       Anesthesia Plan  Procedure only, no anesthetic delivered    History & Physical Review  History and physical reviewed and following examination; no interval change.    ASA Status:  3 .  OB Epidural Asa: 3       Plan for Epidural          Postoperative Care      Consents  Anesthetic plan, risks, benefits and alternatives discussed with:  Patient..                 Radames Padron MD                    .  "

## 2019-12-17 NOTE — ANESTHESIA POSTPROCEDURE EVALUATION
Patient: Yudelka Clarke      S/P epidural for labor.   I or my partner was immediately available for management of this patient during epidural analgesia infusion.  VSS.  Doing well. Block resolved.  Neuro at baseline. Denies positional headache. Minimal side effects easily managed w/ PRN meds. No apparent anesthetic complications. No follow-up required.    Tao Jordan MD    Note:  Anesthesia Post Evaluation    Last vitals:  Vitals:    12/17/19 0200 12/17/19 0541 12/17/19 0830   BP: 114/60 117/61 118/71   Pulse: 74 72    Resp: 16 16 18   Temp: 98.2  F (36.8  C) 98.4  F (36.9  C)    SpO2:            Electronically Signed By: Tao Jordan MD  December 17, 2019  10:57 AM

## 2019-12-17 NOTE — LACTATION NOTE
This note was copied from a baby's chart.  LC visit.  Infant has had borderline low blood sugars. RN started patient pumping and hand expressing.  She was able to get a good volume, 7cc out.  Plan for frequent stimulation with HE and pumping after each feeding to help stabilize blood sugars.  LC will follow up tomorrow to assess latch further. Another LC is also scheduled as patient's nurse this evening to assess latch.  Continue to support.

## 2019-12-17 NOTE — PLAN OF CARE
Data: Yudelka Clarke transferred to [id number] via wheelchair at 2245. Baby transferred via parent's arms.  Action: Receiving unit notified of transfer: Yes. Patient and family notified of room change. Report given to Sirena at 2305. Belongings sent to receiving unit. Accompanied by Registered Nurse. Oriented patient to surroundings. Call light within reach. ID bands double-checked with receiving RN.  Response: Patient tolerated transfer and is stable.

## 2019-12-17 NOTE — ANESTHESIA PROCEDURE NOTES
Peripheral nerve/Neuraxial procedure note : epidural catheter  Pre-Procedure  Performed by Radames Padron MD  Location: OB      Pre-Anesthestic Checklist: patient identified, IV checked, risks and benefits discussed, informed consent, monitors and equipment checked, pre-op evaluation and at physician/surgeon's request    Timeout  Correct Patient: Yes   Correct Procedure: Yes   Correct Site: Yes   Correct Laterality: N/A   Correct Position: Yes   Site Marked: N/A   .   Procedure Documentation    Diagnosis:labor.    Procedure: epidural catheter, .   Patient Position:sitting Insertion Site:L3-4  (midline approach) Injection technique: LORT saline   Local skin infiltrated with mL of 1% lidocaine.  FLO at 7 cm    Patient Prep/Sterile Barriers; mask, sterile gloves, povidone-iodine 7.5% surgical scrub, patient draped.  .  Needle: Touhy needle   Needle Gauge: 17.    Needle Length (Inches) 3.5   # of attempts: 1 and # of redirects:  .    Catheter: 19 G . .  Catheter threaded easily  .  13 cm at skin.   .    Assessment/Narrative  Paresthesias: No.  .  .  Aspiration negative for heme or CSF  . Test dose of 3 mL lidocaine 1.5% w/ 1:200,000 epinephrine at. Test dose negative for signs of intravascular, subdural or intrathecal injection.

## 2019-12-17 NOTE — PROGRESS NOTES
Postpartum progress note  2019     S: Doing well. Pain is well controlled. Lochia minimal. Ambulating without difficulty. Voiding without difficulty. Passing flatus, no BM yet.  Tolerating po intake.     PHYSICAL EXAM:   Vitals:    19 2142 19 0200 19 0541 19 0830   BP: 108/58 114/60 117/61 118/71   Pulse:  74 72    Resp:    Temp:  98.2  F (36.8  C) 98.4  F (36.9  C)    TempSrc:  Oral Oral Oral   SpO2:           General: sitting up, alert and cooperative  Abd: soft, non-distended, non-tender. Fundus firm, nontender, 2 cm below umbilicus.   Extremities: calves nontender, 1+ edema of lower extremities bilaterally      Lab Results   Component Value Date    HGB 11.6 2019    HGB 11.8 10/10/2019     Blood type:   Lab Results   Component Value Date    RH Pos 2019         ASSESSMENT: 34 year old  PPD 1 s/p  after IOL for accelerated fetal growth.     PLAN:  - obesity BMI 45: will order lovenox 40mg daily while in house.  Discussed with patient this AM.  - Heme: 11.6, no s/s ABLA  - Feeding: breast  - Birth control: not discussed   - Rh status: pos, Rhogam not indicated   - Rubella status: immune  - Dispo: home tomorrow    Maritza Hardy MD, MPH  [facility]

## 2019-12-17 NOTE — PLAN OF CARE
Patient meeting expected goals. Is up independent in room, meeting all personal and infant needs. VSS. Breastfeeding is going well. Pain is being managed with Ibuprofen. Bonding well with infant. Spouse is supportive and at bedside.

## 2019-12-17 NOTE — PROGRESS NOTES
Delivery Summary    Yudelka Clarke MRN# [id number]   Age: 34 year old YOB: 1985     ASSESSMENT & PLAN: routine postpartum cares       Labor Event Times    Labor onset date:  19 Onset time:  12:00 PM   Dilation complete date:  19 Complete time:   7:24 PM   Start pushing date/time:  2019 1945      Labor Events     labor?:  No  Labor Type:  Induction, Augmentation  Predominate monitoring during 1st stage:  continuous electronic fetal monitoring     Rupture date/time: 19 0820   Rupture type:  Artificial Rupture of Membranes  Fluid color:  Clear  Fluid odor:  Normal     Induction:  Misoprostol, Oxytocin, Mechanical ripening agent  Induction date/time:     Cervical ripening date/time:        Augmentation:  Oxytocin  1:1 continuous labor support provided by?:  RN       Delivery/Placenta Date and Time    Delivery Date:  19 Delivery Time:   7:48 PM   Placenta Date/Time:  2019  7:51 PM     Vaginal Counts     Initial count performed by 2 team members:   Two Team Members   Dr Ju Mariee RN       Needles Suture [place] Sponges Instruments   Initial counts 2  5    Added to count       Final counts 2  5    Placed during labor Accounted for at the end of labor   No NA   No NA   No NA    Final count performed by 2 team members:   Two Team Members   Dr Ju Mariee RN      Final count correct?:  Yes         Apgars    Living status:  Living   1 Minute 5 Minute 10 Minute 15 Minute 20 Minute   Skin color: 1  1       Heart rate: 2  2       Reflex irritability: 2  2       Muscle tone: 2  2       Respiratory effort: 2  2       Total: 9  9       Apgars assigned by:  CHUY FRAZIER RN     Cord    Vessels:  3 Vessels Complications:  None   Cord Blood Disposition:  Lab Gases Sent?:  No       Resuscitation    Methods:  None          Skin to Skin and Feeding Plan    Skin to skin initiation date/time: 1841    Skin to skin with:  Mother  Skin to skin end date/time:  1/12/1841    How do you plan to feed your baby:  Breastfeeding     Labor Events and Shoulder Dystocia    Fetal Tracing Prior to Delivery:  Category 1  Shoulder dystocia present?:  Neg             Delivery (Maternal) (Provider to Complete) (597242)    Episiotomy:  None  Perineal lacerations:  None       Blood Loss  Mother: Yudelka Clarke #[id number]   Start of Mother's Information    IO Blood Loss  12/16/19 1200 - 12/16/19 2016    Delivery QBL (mL) Hospital Encounter 200 mL    Total  200 mL         End of Mother's Information  Mother: Yudelka Clarke #[id number]         Delivery - Provider to Complete ([id number])    Delivering clinician:  Tom Dodd MD  Attempted Delivery Types (Choose all that apply):  Spontaneous Vaginal Delivery  Delivery Type (Choose the 1 that will go to the Birth History):  Induction of Labor   Other personnel:   Provider Role   Tom Dodd MD Wetschka, Maria, Nicolasa Meraz RN          Placenta    Delayed Cord Clamping:  Done  Date/Time:  12/16/2019  7:51 PM  Removal:  Spontaneous  Disposition:  Hospital disposal     Anesthesia    Method:  Epidural  Cervical dilation at placement:  4-7          Presentation and Position    Presentation:  Vertex   Occiput Anterior           Tom Dodd MD

## 2019-12-17 NOTE — PLAN OF CARE
Patient meeting expected outcomes, VSS. Bonding well with infant (Thuan). Supported in room by  (Jose Angel).  Fundus firm, midline; Lochia rubra, scant.   Feeding: Breast feeding infant with hold/latch assistance from staff. Infant has been sleepy at the breast, so expressing colostrum into mouth.  Mobility: Patients mobililty level scored using the bedside mobility assistance tool (BMAT). Patient is at a mobility level test number: 4. Mobility equipment used: none required. Required assist of  0 staff members. Further use of BMAT scoring not required.  I&O: Regular diet, voiding.   Pain: Denies.   IV: Saline locked, flushes.

## 2019-12-18 VITALS
TEMPERATURE: 97.5 F | SYSTOLIC BLOOD PRESSURE: 132 MMHG | HEART RATE: 75 BPM | OXYGEN SATURATION: 99 % | DIASTOLIC BLOOD PRESSURE: 68 MMHG | RESPIRATION RATE: 18 BRPM

## 2019-12-18 PROCEDURE — 25000132 ZZH RX MED GY IP 250 OP 250 PS 637: Performed by: OBSTETRICS & GYNECOLOGY

## 2019-12-18 RX ADMIN — IBUPROFEN 800 MG: 800 TABLET ORAL at 14:26

## 2019-12-18 RX ADMIN — IBUPROFEN 800 MG: 800 TABLET ORAL at 02:57

## 2019-12-18 NOTE — PLAN OF CARE
Data: Vital signs within normal limits. Postpartum checks within normal limits - see flow record. Patient eating and drinking normally. Patient able to empty bladder independently and is up ambulating. No apparent signs of infection. Patient performing self cares and is able to care for infant.  Action: Patient not medicated during the shift for pain. See MAR. Patient reassessed within 1 hour after each medication and pain was improved - patient stated she was comfortable. Patient education done about . See flow record.  Response: Positive attachment behaviors observed with infant. Support persons spouse  present.   Plan:  discharge today follow up in 6 weeks. All teaching completed, and questions answered, will take OTC analgesia and stool softener, EPDS score 0. Lovenox discontinued as per DR Thorpe. Discharge papers signed . Plans to leave unit when baby has had ECHO this afternoon.

## 2019-12-18 NOTE — PLAN OF CARE
Data: Vital signs within normal limits. Postpartum checks within normal limits see flow record. Patient eating and drinking normally. Patient able to empty bladder independently and is up ambulating. No apparent signs of infection. Patient performing self cares and is able to care for infant.  Action: Patient medicated during the shift for cramping. See MAR. Patient reassessed within 1 hour after each medication and pain was improved patient stated she was comfortable. Patient education done about breastfeeding, hand expression, self cares, infant feeding cues, and car seat testing. See flow record.  Response: Positive attachment behaviors observed with infant. Support person Jose Angel present.   Plan: Anticipate discharge on 12/19/19.

## 2019-12-18 NOTE — DISCHARGE SUMMARY
[facility] Obstetrics Post-Partum Progress Note          Assessment and Plan:    Assessment:   Post-partum day #2  Induced vaginal delivery secondary to fetal macrosomia and polyhydramnios  L&D complications: None      Doing well.  No excessive bleeding  Pain well-controlled.      Plan:   Ambulation encouraged  Pain control measures as needed  Reportable signs and symptoms dicussed with the patient  Discharge later today           Interval History:   Doing well.  Pain is well-controlled.  No fevers.  No history of foul-smelling vaginal discharge.  Good appetite.  Denies chest pain, shortness of breath, nausea or vomiting.  Vaginal bleeding is similar to a heavy menstrual flow.  Ambulatory.  Breastfeeding well.          Significant Problems:      Past Medical History:   Diagnosis Date     Blood type A+      Left forearm fracture     3 years old - cast     Right forearm fracture     4 years old - cast                   Physical Exam:     All vitals stable  Patient Vitals for the past 12 hrs:   BP Temp Temp src Pulse Resp   12/18/19 0034 127/70 98.3  F (36.8  C) Oral 75 16     Uterine fundus is firm, non-tender and at the level of the umbilicus  Ext NT with 1+ edema bilat     Esdras Thorpe MD  12/18/2019 8:39 AM

## 2019-12-18 NOTE — LACTATION NOTE
This note was copied from a baby's chart.  LC visit.  Her baby has been nursing often but is sleepy at breast per Yudelka's report.  She is making good volumes of colostrum, but pumped volumes have thickened so volumes decreased.  LC reviewed LPT infant behavior, reasons why pumping post feeds is important, duration and frequency of feeds and pumping, awakening infant to nurse, and cautioned against pacifier use for now.  Infant had his circumcision earlier today so appears sleepy now.  Plan for discharge home.  She is aware that  recommends continued pumping and offering all EBM back via spoon or bottle until follow up Peds visit, or if infant begins spitting up post feeds, should back away from supplementation.  No overall concerns, however careful monitoring required due to LPT status.   offered to assess a latch prior to discharge if she has any concerns about breastfeeding and positioning.  She nursed her other children and feels confident with latch.

## 2019-12-18 NOTE — PROVIDER NOTIFICATION
12/18/19 1225   Provider Notification   Provider Name/Title DR Thorpe    Method of Notification Phone   Notification Reason Other   Comments   Comments verify if to give lovenox this am discharge today or if needed at home   Lovenox may be discontinued as patient moving and not needed for discharge to home

## 2019-12-18 NOTE — DISCHARGE INSTRUCTIONS
Postpartum Vaginal Delivery Instructions  Follow up in 6 weeks with your OB.  Lactation     Activity       Ask family and friends for help when you need it.    Do not place anything in your vagina for 6 weeks.    You are not restricted on other activities, but take it easy for a few weeks to allow your body to recover from delivery.  You are able to do any activities you feel up to that point.    No driving until you have stopped taking your pain medications (usually two weeks after delivery).     Call your health care provider if you have any of these symptoms:       Increased pain, swelling, redness, or fluid around your stiches from an episiotomy or perineal tear.    A fever above 100.4 F (38 C) with or without chills when placing a thermometer under your tongue.    You soak a sanitary pad with blood within 1 hour, or you see blood clots larger than a golf ball.    Bleeding that lasts more than 6 weeks.    Vaginal discharge that smells bad.    Severe pain, cramping or tenderness in your lower belly area.    A need to urinate more frequently (use the toilet more often), more urgently (use the toilet very quickly), or it burns when you urinate.    Nausea and vomiting.    Redness, swelling or pain around a vein in your leg.    Problems breastfeeding or a red or painful area on your breast.    Chest pain and cough or are gasping for air.    Problems coping with sadness, anxiety, or depression.  If you have any concerns about hurting yourself or the baby, call your provider immediately.     You have questions or concerns after you return home.     Keep your hands clean:  Always wash your hands before touching your perineal area and stitches.  This helps reduce your risk of infection.  If your hands aren't dirty, you may use an alcohol hand-rub to clean your hands. Keep your nails clean and short.

## 2019-12-18 NOTE — PLAN OF CARE
Patient stable. Motrin for discomfort. Breastfeeding infant well. Pumping afterwards to supplement baby. Family at bedside.

## 2020-01-03 ENCOUNTER — TELEPHONE (OUTPATIENT)
Dept: OBGYN | Facility: CLINIC | Age: 35
End: 2020-01-03

## 2020-01-03 NOTE — TELEPHONE ENCOUNTER
Form received from: Pt; Yudelka Clarke     Form requesting following info/need: SHENA form for [place]     LOUIE needed?: NA     Location of form: Basket above Anabel's desk     When completed the route for return: Please fax to number on form and call pt to  copy when completed

## 2020-01-06 NOTE — TELEPHONE ENCOUNTER
Forms completed, faxed, copy sent to scanning and original in Lonnie's nurse bin. Beth Johnson LPN

## 2020-02-04 ENCOUNTER — PRENATAL OFFICE VISIT (OUTPATIENT)
Dept: OBGYN | Facility: CLINIC | Age: 35
End: 2020-02-04
Payer: COMMERCIAL

## 2020-02-04 VITALS
SYSTOLIC BLOOD PRESSURE: 120 MMHG | WEIGHT: 293 LBS | BODY MASS INDEX: 45.99 KG/M2 | HEIGHT: 67 IN | DIASTOLIC BLOOD PRESSURE: 74 MMHG

## 2020-02-04 PROCEDURE — 99207 ZZC POST PARTUM EXAM: CPT | Performed by: OBSTETRICS & GYNECOLOGY

## 2020-02-04 ASSESSMENT — PATIENT HEALTH QUESTIONNAIRE - PHQ9: SUM OF ALL RESPONSES TO PHQ QUESTIONS 1-9: 1

## 2020-02-04 ASSESSMENT — MIFFLIN-ST. JEOR: SCORE: 2061.21

## 2020-02-04 NOTE — PROGRESS NOTES
"Yudelka is here for a 6-week postpartum checkup.    She had a  of a viable boy, weight 7 pounds 6 oz., with none complications. Date of delivery was 2019. Since delivery, she has been breast feeding.  She has no signs of infection, bleeding or other complications.  She is not pregnant.  We discussed contraceptions and she has chosen condoms until  has vasectomy.      Post partum tubal: No  History of Gestational Diabetes? No  Type of Delivery:  Vaginal  Feeding Method:  Breast  If initiated breast feeding and stopped, how long did you breast feed?:  Not applicable    REVIEW OF SYSTEMS:  Negative in all systems.  Contraception Plan: vasectomy and condoms    Medical History Reviewed yes -   Family History Reviewed yes -   Problem List Updated no    EXAM:  /74   Ht 1.702 m (5' 7\")   Wt 133.4 kg (294 lb)   LMP 2019 (Exact Date)   Breastfeeding Yes   BMI 46.05 kg/m    HEENT: grossly normal.  ABDOMEN: soft, non tender, good bowel sounds, without masses rebound, guarding or tenderness.  PELVIC:  External genitalia; normal without lesion, repair well healed.   Vagina: normal mucosa and rugae, no discharge.    EXTREMITIES:  warm to touch, good pulses, no ankle edema or calf tenderness.  NEUROLOGIC: grossly normal.    ASSESSMENT:   6-week postpartum exam after .    PLAN:    Contraception methods discussed  Exercise encouraged  Follow up in 1 year.  One-hour glucose tolerance test needed? No  Condoms for contraception until vasectomy is completed--they are not planning more children.    Pao Fleming MD      "

## 2020-02-04 NOTE — NURSING NOTE
"Chief Complaint   Patient presents with     Postpartum Care       Initial /74   Ht 1.702 m (5' 7\")   Wt 133.4 kg (294 lb)   LMP 2019 (Exact Date)   Breastfeeding Yes   BMI 46.05 kg/m   Estimated body mass index is 46.05 kg/m  as calculated from the following:    Height as of this encounter: 1.702 m (5' 7\").    Weight as of this encounter: 133.4 kg (294 lb).  BP completed using cuff size: large    Questioned patient about current smoking habits.  Pt. has never smoked.          The following HM Due: NONE      The following patient reported/Care Every where data was sent to:  P ABSTRACT QUALITY INITIATIVES [26099]  Beth Johnson LPN               "

## 2020-05-05 ENCOUNTER — MYC MEDICAL ADVICE (OUTPATIENT)
Dept: OBGYN | Facility: CLINIC | Age: 35
End: 2020-05-05

## 2020-05-05 DIAGNOSIS — N93.9 ABNORMAL UTERINE BLEEDING (AUB): Primary | ICD-10-CM

## 2020-05-06 NOTE — TELEPHONE ENCOUNTER
Recommend pelvic US then I will get in touch with results and we can decide if she needs in clinic visit or phone visit. Thanks.    Pao Fleming MD

## 2020-05-27 ENCOUNTER — ANCILLARY PROCEDURE (OUTPATIENT)
Dept: ULTRASOUND IMAGING | Facility: CLINIC | Age: 35
End: 2020-05-27
Attending: OBSTETRICS & GYNECOLOGY
Payer: COMMERCIAL

## 2020-05-27 DIAGNOSIS — N93.9 ABNORMAL UTERINE BLEEDING (AUB): ICD-10-CM

## 2020-05-27 PROCEDURE — 76856 US EXAM PELVIC COMPLETE: CPT | Performed by: OBSTETRICS & GYNECOLOGY

## 2020-05-27 PROCEDURE — 76830 TRANSVAGINAL US NON-OB: CPT | Performed by: OBSTETRICS & GYNECOLOGY

## 2020-12-27 ENCOUNTER — HEALTH MAINTENANCE LETTER (OUTPATIENT)
Age: 35
End: 2020-12-27

## 2021-03-06 ENCOUNTER — HEALTH MAINTENANCE LETTER (OUTPATIENT)
Age: 36
End: 2021-03-06

## 2021-04-13 NOTE — PLAN OF CARE
Infant bonding well with mother and father. Breastfeeding, pumping, and hand expression going well.Infant tolerating feedings well.  Infant had car seat testing overnight and passed. Voiding and stooling adequate. TCB was high intermediate 6.6 and a repeat TCB is scheduled for this morning. Vital signs stable and infant doing well. Will continue to monitor and assess.    Left the patient a detailed voicemail with lab results. Advised to increase his Rx:Glipizide 10 mg to twice daily (to make 20 mg daily - verbal approval from Dr DUQUE since 20 mg tablets available) along with working a lot harder on a low sugar, low carbohydrate diet for follow up in 3 months. Also advised to cut down on using any alcohol and NSAID use to try to improve his liver function. Suggested a call back if any questions or concerns.

## 2021-04-21 ENCOUNTER — IMMUNIZATION (OUTPATIENT)
Dept: NURSING | Facility: CLINIC | Age: 36
End: 2021-04-21
Payer: COMMERCIAL

## 2021-04-21 PROCEDURE — 0001A PR COVID VAC PFIZER DIL RECON 30 MCG/0.3 ML IM: CPT

## 2021-04-21 PROCEDURE — 91300 PR COVID VAC PFIZER DIL RECON 30 MCG/0.3 ML IM: CPT

## 2021-05-12 ENCOUNTER — IMMUNIZATION (OUTPATIENT)
Dept: NURSING | Facility: CLINIC | Age: 36
End: 2021-05-12
Attending: INTERNAL MEDICINE
Payer: COMMERCIAL

## 2021-05-12 PROCEDURE — 0002A PR COVID VAC PFIZER DIL RECON 30 MCG/0.3 ML IM: CPT

## 2021-05-12 PROCEDURE — 91300 PR COVID VAC PFIZER DIL RECON 30 MCG/0.3 ML IM: CPT

## 2021-07-09 NOTE — MR AVS SNAPSHOT
"              After Visit Summary   5/26/2017    Yudelka Clarke    MRN: [id number]           Patient Information     Date Of Birth          1985        Visit Information        Provider Department      5/26/2017 11:15 AM Pao Fleming MD [facility]        Today's Diagnoses     Screening cholesterol level    -  1    Routine postpartum follow-up           Follow-ups after your visit        Who to contact     If you have questions or need follow up information about today's clinic visit or your schedule please contact [facility] directly at [phone].  Normal or non-critical lab and imaging results will be communicated to you by BevyUphart, letter or phone within 4 business days after the clinic has received the results. If you do not hear from us within 7 days, please contact the clinic through SofGeniet or phone. If you have a critical or abnormal lab result, we will notify you by phone as soon as possible.  Submit refill requests through What's in My Handbag or call your pharmacy and they will forward the refill request to us. Please allow 3 business days for your refill to be completed.          Additional Information About Your Visit        MyChart Information     What's in My Handbag gives you secure access to your electronic health record. If you see a primary care provider, you can also send messages to your care team and make appointments. If you have questions, please call your primary care clinic.  If you do not have a primary care provider, please call [phone] and they will assist you.        Care EveryWhere ID     This is your Care EveryWhere ID. This could be used by other organizations to access your [facility] medical records  [id number]        Your Vitals Were     Pulse Temperature Height Last Period Breastfeeding? BMI (Body Mass Index)    71 98  F (36.7  C) (Oral) 5' 7\" (1.702 m) 08/25/2016 Yes 44.73 kg/m2       Blood Pressure from Last 3 Encounters:   05/26/17 125/82 "   04/13/17 136/73   03/31/17 106/70    Weight from Last 3 Encounters:   05/26/17 285 lb 9.6 oz (129.5 kg)   04/10/17 277 lb (125.6 kg)   03/31/17 277 lb 3.2 oz (125.7 kg)              We Performed the Following     Glucose     Lipid Profile          Today's Medication Changes          These changes are accurate as of: 5/26/17 12:01 PM.  If you have any questions, ask your nurse or doctor.               Stop taking these medicines if you haven't already. Please contact your care team if you have questions.     ibuprofen 200 MG tablet   Commonly known as:  ADVIL/MOTRIN   Stopped by:  Pao Fleming MD           senna-docusate 8.6-50 MG per tablet   Commonly known as:  SENOKOT-S;PERICOLACE   Stopped by:  Pao Fleming MD                    Primary Care Provider Office Phone # Fax #    Stevo Laurent Barros [phone]       [facility] [id number]  [address] [facility] [address]        Thank you!     Thank you for choosing [facility]  for your care. Our goal is always to provide you with excellent care. Hearing back from our patients is one way we can continue to improve our services. Please take a few minutes to complete the written survey that you may receive in the mail after your visit with us. Thank you!             Your Updated Medication List - Protect others around you: Learn how to safely use, store and throw away your medicines at www.disposemymeds.org.          This list is accurate as of: 5/26/17 12:01 PM.  Always use your most recent med list.                   Brand Name Dispense Instructions for use    acetaminophen 325 MG tablet    TYLENOL    100 tablet    Take 2 tablets (650 mg) by mouth every 4 hours as needed for mild pain or fever (greater than or equal to 38? C /100.4? F (oral) or 38.5? C/ 101.4? F (core).)       PRENATAL PO             [phone]

## 2021-10-04 ENCOUNTER — HEALTH MAINTENANCE LETTER (OUTPATIENT)
Age: 36
End: 2021-10-04

## 2021-12-16 ENCOUNTER — IMMUNIZATION (OUTPATIENT)
Dept: NURSING | Facility: CLINIC | Age: 36
End: 2021-12-16
Payer: COMMERCIAL

## 2021-12-16 PROCEDURE — 0004A PR COVID VAC PFIZER DIL RECON 30 MCG/0.3 ML IM: CPT

## 2021-12-16 PROCEDURE — 91300 PR COVID VAC PFIZER DIL RECON 30 MCG/0.3 ML IM: CPT

## 2022-03-20 ENCOUNTER — HEALTH MAINTENANCE LETTER (OUTPATIENT)
Age: 37
End: 2022-03-20

## 2022-03-21 ENCOUNTER — OFFICE VISIT (OUTPATIENT)
Dept: FAMILY MEDICINE | Facility: CLINIC | Age: 37
End: 2022-03-21
Payer: COMMERCIAL

## 2022-03-21 VITALS
SYSTOLIC BLOOD PRESSURE: 136 MMHG | BODY MASS INDEX: 45.99 KG/M2 | RESPIRATION RATE: 12 BRPM | HEART RATE: 69 BPM | HEIGHT: 67 IN | OXYGEN SATURATION: 98 % | DIASTOLIC BLOOD PRESSURE: 88 MMHG | TEMPERATURE: 98.2 F | WEIGHT: 293 LBS

## 2022-03-21 DIAGNOSIS — H69.92 DYSFUNCTION OF LEFT EUSTACHIAN TUBE: Primary | ICD-10-CM

## 2022-03-21 PROBLEM — L30.9 ECZEMA: Status: ACTIVE | Noted: 2022-03-21

## 2022-03-21 PROBLEM — N91.1 SECONDARY PHYSIOLOGIC AMENORRHEA: Status: ACTIVE | Noted: 2022-03-21

## 2022-03-21 PROBLEM — L68.9 HYPERTRICHOSIS: Status: ACTIVE | Noted: 2022-03-21

## 2022-03-21 PROCEDURE — 99203 OFFICE O/P NEW LOW 30 MIN: CPT | Performed by: NURSE PRACTITIONER

## 2022-03-21 ASSESSMENT — PAIN SCALES - GENERAL: PAINLEVEL: MILD PAIN (3)

## 2022-03-21 NOTE — NURSING NOTE
"Chief Complaint   Patient presents with     Ear Problem     Initial /88 (BP Location: Right arm, Patient Position: Sitting, Cuff Size: Adult Large)   Pulse 69   Temp 98.2  F (36.8  C) (Oral)   Resp 12   Ht 1.708 m (5' 7.25\")   Wt 147.7 kg (325 lb 9.6 oz)   LMP 01/23/2022   SpO2 98%   BMI 50.62 kg/m   Estimated body mass index is 50.62 kg/m  as calculated from the following:    Height as of this encounter: 1.708 m (5' 7.25\").    Weight as of this encounter: 147.7 kg (325 lb 9.6 oz).  BP completed using cuff size large right arm    Lisa Magill, CMA    "

## 2022-03-21 NOTE — PATIENT INSTRUCTIONS
Start taking ZyrtecD.  You will need to ask the pharmacist for this.   You can also use flonase, over the counter nasal spray.   If over the course of the next couple weeks your symptoms are not improving please let me know.

## 2022-03-21 NOTE — PROGRESS NOTES
"  Assessment & Plan     Dysfunction of left eustachian tube  Will have her try antihistamine/decongestant combo.  Can also try nasal spray.  If symptoms are not resolving over the next couple weeks or are worsening can let me know and can refer to ENT.          Follow-up 3-4 months for physical     No follow-ups on file.    TATUM Larios CNP  [facility] AKIL Jha is a 36 year old who presents for the following health issues     History of Present Illness       Reason for visit:  Left ear pain  Symptom onset:  3-4 weeks ago  Symptoms include:  Pain in the ear  Symptom intensity:  Moderate  Symptom progression:  Worsening  Had these symptoms before:  No  What makes it worse:  Loud noises  What makes it better:  OTC pain meds    She eats 2-3 servings of fruits and vegetables daily.She consumes 1 sweetened beverage(s) daily.She exercises with enough effort to increase her heart rate 10 to 19 minutes per day.  She exercises with enough effort to increase her heart rate 3 or less days per week.   She is taking medications regularly.     Had ear infections a child, but nothing as an adult.  No allergies or recent URI.  Hearing decreased and has a helicopter sound in the L ear.  No drainage.  Pain radiates into the neck. Had some congestion/nasal stuffiness a couple weeks ago.         Review of Systems   Constitutional, HEENT, cardiovascular, pulmonary, gi and gu systems are negative, except as otherwise noted.      Objective    /88 (BP Location: Right arm, Patient Position: Sitting, Cuff Size: Adult Large)   Pulse 69   Temp 98.2  F (36.8  C) (Oral)   Resp 12   Ht 1.708 m (5' 7.25\")   Wt 147.7 kg (325 lb 9.6 oz)   LMP 01/23/2022   SpO2 98%   BMI 50.62 kg/m    Body mass index is 50.62 kg/m .  Physical Exam   GENERAL: healthy, alert and no distress  EYES: Eyes grossly normal to inspection and conjunctivae and sclerae normal  HENT: ear canals and R TM normal, mild " L serous effusion, no redness or bulging, nose and mouth without ulcers or lesions  NECK: no adenopathy, no asymmetry, masses, or scars and thyroid normal to palpation  RESP: lungs clear to auscultation - no rales, rhonchi or wheezes  CV: regular rate and rhythm, normal S1 S2, no S3 or S4, no murmur, click or rub  SKIN: no suspicious lesions or rashes    No results found for this or any previous visit (from the past 24 hour(s)).

## 2022-05-01 ENCOUNTER — HOSPITAL ENCOUNTER (EMERGENCY)
Dept: HOSPITAL 50 - VM.ED | Age: 37
Discharge: HOME | End: 2022-05-01
Payer: COMMERCIAL

## 2022-05-01 DIAGNOSIS — K04.7: Primary | ICD-10-CM

## 2022-05-01 RX ADMIN — AMOXICILLIN AND CLAVULANATE POTASSIUM ONE PACKET: 875; 125 TABLET, FILM COATED ORAL at 04:27

## 2022-05-01 RX ADMIN — HYDROCODONE BITARTRATE AND ACETAMINOPHEN ONE PACKET: 5; 325 TABLET ORAL at 04:27

## 2022-05-02 NOTE — NURSING NOTE
"Chief Complaint   Patient presents with     Prenatal Care       Initial /80   Wt 138.3 kg (305 lb)   LMP 2019 (Exact Date)   Breastfeeding No   BMI 47.77 kg/m   Estimated body mass index is 47.77 kg/m  as calculated from the following:    Height as of 7/3/19: 1.702 m (5' 7\").    Weight as of this encounter: 138.3 kg (305 lb).  BP completed using cuff size: large    Questioned patient about current smoking habits.  Pt. has never smoked.          36w1d  + FM daily  - contractions  - bleeding or LOF  + pelvic pressure  + mild edema  Beth Johnson LPN               "
Not applicable

## 2022-09-11 ENCOUNTER — HEALTH MAINTENANCE LETTER (OUTPATIENT)
Age: 37
End: 2022-09-11

## 2023-04-30 ENCOUNTER — HEALTH MAINTENANCE LETTER (OUTPATIENT)
Age: 38
End: 2023-04-30

## 2023-05-11 SDOH — ECONOMIC STABILITY: TRANSPORTATION INSECURITY
IN THE PAST 12 MONTHS, HAS LACK OF TRANSPORTATION KEPT YOU FROM MEETINGS, WORK, OR FROM GETTING THINGS NEEDED FOR DAILY LIVING?: NO

## 2023-05-11 SDOH — HEALTH STABILITY: PHYSICAL HEALTH: ON AVERAGE, HOW MANY DAYS PER WEEK DO YOU ENGAGE IN MODERATE TO STRENUOUS EXERCISE (LIKE A BRISK WALK)?: 2 DAYS

## 2023-05-11 SDOH — ECONOMIC STABILITY: FOOD INSECURITY: WITHIN THE PAST 12 MONTHS, THE FOOD YOU BOUGHT JUST DIDN'T LAST AND YOU DIDN'T HAVE MONEY TO GET MORE.: NEVER TRUE

## 2023-05-11 SDOH — HEALTH STABILITY: PHYSICAL HEALTH: ON AVERAGE, HOW MANY MINUTES DO YOU ENGAGE IN EXERCISE AT THIS LEVEL?: 30 MIN

## 2023-05-11 SDOH — ECONOMIC STABILITY: TRANSPORTATION INSECURITY
IN THE PAST 12 MONTHS, HAS THE LACK OF TRANSPORTATION KEPT YOU FROM MEDICAL APPOINTMENTS OR FROM GETTING MEDICATIONS?: NO

## 2023-05-11 SDOH — ECONOMIC STABILITY: FOOD INSECURITY: WITHIN THE PAST 12 MONTHS, YOU WORRIED THAT YOUR FOOD WOULD RUN OUT BEFORE YOU GOT MONEY TO BUY MORE.: NEVER TRUE

## 2023-05-11 SDOH — ECONOMIC STABILITY: INCOME INSECURITY: HOW HARD IS IT FOR YOU TO PAY FOR THE VERY BASICS LIKE FOOD, HOUSING, MEDICAL CARE, AND HEATING?: NOT HARD AT ALL

## 2023-05-11 SDOH — ECONOMIC STABILITY: INCOME INSECURITY: IN THE LAST 12 MONTHS, WAS THERE A TIME WHEN YOU WERE NOT ABLE TO PAY THE MORTGAGE OR RENT ON TIME?: NO

## 2023-05-11 ASSESSMENT — ENCOUNTER SYMPTOMS
NAUSEA: 0
JOINT SWELLING: 0
COUGH: 0
BREAST MASS: 0
CHILLS: 0
SORE THROAT: 0
ABDOMINAL PAIN: 0
WEAKNESS: 0
DYSURIA: 0
ARTHRALGIAS: 1
SHORTNESS OF BREATH: 0
DIZZINESS: 0
MYALGIAS: 0
HEARTBURN: 0
FEVER: 0
DIARRHEA: 0
PARESTHESIAS: 0
FREQUENCY: 0
CONSTIPATION: 0
PALPITATIONS: 0
HEMATURIA: 0
EYE PAIN: 0
HEMATOCHEZIA: 0
HEADACHES: 0
NERVOUS/ANXIOUS: 0

## 2023-05-11 ASSESSMENT — LIFESTYLE VARIABLES
AUDIT-C TOTAL SCORE: 2
HOW MANY STANDARD DRINKS CONTAINING ALCOHOL DO YOU HAVE ON A TYPICAL DAY: 1 OR 2
SKIP TO QUESTIONS 9-10: 1
HOW OFTEN DO YOU HAVE A DRINK CONTAINING ALCOHOL: 2-4 TIMES A MONTH
HOW OFTEN DO YOU HAVE SIX OR MORE DRINKS ON ONE OCCASION: NEVER

## 2023-05-11 ASSESSMENT — SOCIAL DETERMINANTS OF HEALTH (SDOH)
IN A TYPICAL WEEK, HOW MANY TIMES DO YOU TALK ON THE PHONE WITH FAMILY, FRIENDS, OR NEIGHBORS?: MORE THAN THREE TIMES A WEEK
HOW OFTEN DO YOU ATTEND CHURCH OR RELIGIOUS SERVICES?: NEVER
HOW OFTEN DO YOU GET TOGETHER WITH FRIENDS OR RELATIVES?: TWICE A WEEK
DO YOU BELONG TO ANY CLUBS OR ORGANIZATIONS SUCH AS CHURCH GROUPS UNIONS, FRATERNAL OR ATHLETIC GROUPS, OR SCHOOL GROUPS?: YES

## 2023-05-12 ENCOUNTER — OFFICE VISIT (OUTPATIENT)
Dept: FAMILY MEDICINE | Facility: CLINIC | Age: 38
End: 2023-05-12
Payer: COMMERCIAL

## 2023-05-12 VITALS
SYSTOLIC BLOOD PRESSURE: 126 MMHG | TEMPERATURE: 98.1 F | WEIGHT: 293 LBS | HEIGHT: 67 IN | HEART RATE: 77 BPM | DIASTOLIC BLOOD PRESSURE: 81 MMHG | OXYGEN SATURATION: 98 % | BODY MASS INDEX: 45.99 KG/M2 | RESPIRATION RATE: 18 BRPM

## 2023-05-12 DIAGNOSIS — E66.01 CLASS 3 SEVERE OBESITY DUE TO EXCESS CALORIES WITHOUT SERIOUS COMORBIDITY WITH BODY MASS INDEX (BMI) OF 45.0 TO 49.9 IN ADULT (H): ICD-10-CM

## 2023-05-12 DIAGNOSIS — Z11.59 NEED FOR HEPATITIS C SCREENING TEST: ICD-10-CM

## 2023-05-12 DIAGNOSIS — L67.8 ABNORMAL FACIAL HAIR: ICD-10-CM

## 2023-05-12 DIAGNOSIS — E66.813 CLASS 3 SEVERE OBESITY DUE TO EXCESS CALORIES WITHOUT SERIOUS COMORBIDITY WITH BODY MASS INDEX (BMI) OF 45.0 TO 49.9 IN ADULT (H): ICD-10-CM

## 2023-05-12 DIAGNOSIS — Z00.00 ROUTINE GENERAL MEDICAL EXAMINATION AT A HEALTH CARE FACILITY: Primary | ICD-10-CM

## 2023-05-12 DIAGNOSIS — Z13.21 ENCOUNTER FOR VITAMIN DEFICIENCY SCREENING: ICD-10-CM

## 2023-05-12 DIAGNOSIS — Z13.1 SCREENING FOR DIABETES MELLITUS: ICD-10-CM

## 2023-05-12 LAB
ALBUMIN SERPL BCG-MCNC: 4.1 G/DL (ref 3.5–5.2)
ALP SERPL-CCNC: 88 U/L (ref 35–104)
ALT SERPL W P-5'-P-CCNC: 15 U/L (ref 10–35)
ANION GAP SERPL CALCULATED.3IONS-SCNC: 11 MMOL/L (ref 7–15)
AST SERPL W P-5'-P-CCNC: 18 U/L (ref 10–35)
BILIRUB SERPL-MCNC: 0.3 MG/DL
BUN SERPL-MCNC: 13.1 MG/DL (ref 6–20)
CALCIUM SERPL-MCNC: 9.1 MG/DL (ref 8.6–10)
CHLORIDE SERPL-SCNC: 107 MMOL/L (ref 98–107)
CHOLEST SERPL-MCNC: 173 MG/DL
CREAT SERPL-MCNC: 0.77 MG/DL (ref 0.51–0.95)
DEPRECATED HCO3 PLAS-SCNC: 25 MMOL/L (ref 22–29)
ERYTHROCYTE [DISTWIDTH] IN BLOOD BY AUTOMATED COUNT: 13.3 % (ref 10–15)
GFR SERPL CREATININE-BSD FRML MDRD: >90 ML/MIN/1.73M2
GLUCOSE SERPL-MCNC: 101 MG/DL (ref 70–99)
HBA1C MFR BLD: 5.2 % (ref 0–5.6)
HCT VFR BLD AUTO: 39.3 % (ref 35–47)
HDLC SERPL-MCNC: 46 MG/DL
HGB BLD-MCNC: 12.7 G/DL (ref 11.7–15.7)
LDLC SERPL CALC-MCNC: 103 MG/DL
MCH RBC QN AUTO: 27.8 PG (ref 26.5–33)
MCHC RBC AUTO-ENTMCNC: 32.3 G/DL (ref 31.5–36.5)
MCV RBC AUTO: 86 FL (ref 78–100)
NONHDLC SERPL-MCNC: 127 MG/DL
PLATELET # BLD AUTO: 269 10E3/UL (ref 150–450)
POTASSIUM SERPL-SCNC: 4 MMOL/L (ref 3.4–5.3)
PROT SERPL-MCNC: 7.4 G/DL (ref 6.4–8.3)
RBC # BLD AUTO: 4.57 10E6/UL (ref 3.8–5.2)
SODIUM SERPL-SCNC: 143 MMOL/L (ref 136–145)
TRIGL SERPL-MCNC: 121 MG/DL
TSH SERPL DL<=0.005 MIU/L-ACNC: 2.13 UIU/ML (ref 0.3–4.2)
WBC # BLD AUTO: 7.4 10E3/UL (ref 4–11)

## 2023-05-12 PROCEDURE — 83036 HEMOGLOBIN GLYCOSYLATED A1C: CPT | Performed by: NURSE PRACTITIONER

## 2023-05-12 PROCEDURE — 99214 OFFICE O/P EST MOD 30 MIN: CPT | Mod: 25 | Performed by: NURSE PRACTITIONER

## 2023-05-12 PROCEDURE — 85027 COMPLETE CBC AUTOMATED: CPT | Performed by: NURSE PRACTITIONER

## 2023-05-12 PROCEDURE — 36415 COLL VENOUS BLD VENIPUNCTURE: CPT | Performed by: NURSE PRACTITIONER

## 2023-05-12 PROCEDURE — 99395 PREV VISIT EST AGE 18-39: CPT | Performed by: NURSE PRACTITIONER

## 2023-05-12 PROCEDURE — 82306 VITAMIN D 25 HYDROXY: CPT | Performed by: NURSE PRACTITIONER

## 2023-05-12 PROCEDURE — 80061 LIPID PANEL: CPT | Performed by: NURSE PRACTITIONER

## 2023-05-12 PROCEDURE — 80053 COMPREHEN METABOLIC PANEL: CPT | Performed by: NURSE PRACTITIONER

## 2023-05-12 PROCEDURE — 84443 ASSAY THYROID STIM HORMONE: CPT | Performed by: NURSE PRACTITIONER

## 2023-05-12 RX ORDER — SPIRONOLACTONE 25 MG/1
25 TABLET ORAL DAILY
Qty: 30 TABLET | Refills: 3 | Status: SHIPPED | OUTPATIENT
Start: 2023-05-12 | End: 2023-06-20

## 2023-05-12 ASSESSMENT — ENCOUNTER SYMPTOMS
FREQUENCY: 0
DIZZINESS: 0
ABDOMINAL PAIN: 0
COUGH: 0
HEARTBURN: 0
CONSTIPATION: 0
SHORTNESS OF BREATH: 0
EYE PAIN: 0
NAUSEA: 0
DYSURIA: 0
PALPITATIONS: 0
PARESTHESIAS: 0
NERVOUS/ANXIOUS: 0
JOINT SWELLING: 0
MYALGIAS: 0
HEADACHES: 0
ARTHRALGIAS: 1
CHILLS: 0
WEAKNESS: 0
FEVER: 0
DIARRHEA: 0
BREAST MASS: 0
SORE THROAT: 0
HEMATURIA: 0
HEMATOCHEZIA: 0

## 2023-05-12 ASSESSMENT — PAIN SCALES - GENERAL: PAINLEVEL: NO PAIN (0)

## 2023-05-12 NOTE — LETTER
5/12/2023      To whom this concerns,     I have seen and evaluated Mrs. Yudelka Clarke today for her annual physical. She does have a BMI of > 40 and is actively working on weight loss and lifestyle changes. She does qualify for medical necessity for aerobic rowing machine to facilitate in her weight loss and healthy lifestyle changes to reverse and prevent medical complications related to morbid obesity.    Please let me know if you have any questions      Alexandra Granados, VIOLET, APRN, FNP        __________________________________  [facility]  [address] [place] [id number]  Phone: [phone]

## 2023-05-12 NOTE — PROGRESS NOTES
SUBJECTIVE:   CC: Yudelka is an 37 year old who presents for preventive health visit.       5/12/2023    11:07 AM   Additional Questions   Roomed by Rhonda RAMOS   Patient has been advised of split billing requirements and indicates understanding: Yes  Healthy Habits:     Getting at least 3 servings of Calcium per day:  Yes    Bi-annual eye exam:  Yes    Dental care twice a year:  Yes    Sleep apnea or symptoms of sleep apnea:  Daytime drowsiness and Excessive snoring    Diet:  Regular (no restrictions)    Frequency of exercise:  1 day/week    Duration of exercise:  15-30 minutes    Taking medications regularly:  Yes    Medication side effects:  None    PHQ-2 Total Score: 0    Additional concerns today:  Yes    She is here for a physical. She would like to loose weight. She has tried Keto in the past and that did not make her feel well. She is tired and wants to have more energy.     She did have some toast today.    Otherwise she has no concerns today.       Today's PHQ-2 Score:       5/11/2023    12:21 PM   PHQ-2 ( 1999 [employer])   Q1: Little interest or pleasure in doing things 0   Q2: Feeling down, depressed or hopeless 0   PHQ-2 Score 0   Q1: Little interest or pleasure in doing things Not at all    Not at all   Q2: Feeling down, depressed or hopeless Not at all    Not at all   PHQ-2 Score 0    0       Have you ever done Advance Care Planning? (For example, a Health Directive, POLST, or a discussion with a medical provider or your loved ones about your wishes): No, advance care planning information given to patient to review.  Patient plans to discuss their wishes with loved ones or provider.      Social History     Tobacco Use     Smoking status: Never     Smokeless tobacco: Never   Vaping Use     Vaping status: Never Used     Passive vaping exposure: Yes   Substance Use Topics     Alcohol use: No           5/11/2023    12:21 PM   Alcohol Use   Prescreen: >3 drinks/day or >7 drinks/week? No     Reviewed orders  with patient.  Reviewed health maintenance and updated orders accordingly - Yes  Lab work is in process    Breast Cancer Screening: n/a denies breast cancer in the family         2023    12:30 PM   Breast CA Risk Assessment (FHS-7)   Do you have a family history of breast, colon, or ovarian cancer? No / Unknown       Patient under 40 years of age: Routine Mammogram Screening not recommended.   Pertinent mammograms are reviewed under the imaging tab.    History of abnormal Pap smear: NO - age 30-65 PAP every 5 years with negative HPV co-testing recommended      Latest Ref Rng & Units 2019    11:06 AM 2019    10:15 AM 2016    12:00 AM   PAP / HPV   PAP (Historical)  NIL    NIL     HPV 16 DNA NEG^Negative  Negative      HPV 18 DNA NEG^Negative  Negative      Other HR HPV NEG^Negative  Negative        Reviewed and updated as needed this visit by clinical staff   Tobacco  Allergies  Meds              Reviewed and updated as needed this visit by Provider      Problems  Med Hx  Surg Hx          Past Medical History:   Diagnosis Date     Blood type A+      Left forearm fracture     3 years old - cast     Right forearm fracture     4 years old - cast      Past Surgical History:   Procedure Laterality Date     HC TOOTH EXTRACTION W/FORCEP       OSTEOTOMY SAGITTAL SPLIT N/A 2016    jaw surgery for aligment of teeth     OB History    Para Term  AB Living   2 2 0 2 0 2   SAB IAB Ectopic Multiple Live Births   0 0 0 0 2      # Outcome Date GA Lbr Jose Luis/2nd Weight Sex Delivery Anes PTL Lv   2  19 36w5d 07:24 / 00:24 3.35 kg (7 lb 6.2 oz) M Induction EPI N LISA      Name: REMINGTONMALE-BOO      Apgar1: 9  Apgar5: 9   1  17 30w0d 00:25 / 00:02 1.58 kg (3 lb 7.7 oz) M Vag-Spont Local Y LISA      Name: REMINGTONBABY1 BOO      Apgar1: 7  Apgar5: 8       Review of Systems   Constitutional: Negative for chills and fever.   HENT: Negative for congestion, ear pain,  "hearing loss and sore throat.    Eyes: Negative for pain and visual disturbance.   Respiratory: Negative for cough and shortness of breath.    Cardiovascular: Negative for chest pain, palpitations and peripheral edema.   Gastrointestinal: Negative for abdominal pain, constipation, diarrhea, heartburn, hematochezia and nausea.   Breasts:  Negative for tenderness, breast mass and discharge.   Genitourinary: Negative for dysuria, frequency, genital sores, hematuria, pelvic pain, urgency, vaginal bleeding and vaginal discharge.   Musculoskeletal: Positive for arthralgias. Negative for joint swelling and myalgias.   Skin: Negative for rash.   Neurological: Negative for dizziness, weakness, headaches and paresthesias.   Psychiatric/Behavioral: Negative for mood changes. The patient is not nervous/anxious.         OBJECTIVE:   /81 (BP Location: Right arm, Patient Position: Sitting, Cuff Size: Adult Large)   Pulse 77   Temp 98.1  F (36.7  C) (Oral)   Resp 18   Ht 1.708 m (5' 7.25\")   Wt 144.2 kg (317 lb 14.4 oz)   LMP 03/22/2023 (Approximate)   SpO2 98%   Breastfeeding No   BMI 49.42 kg/m    Physical Exam  GENERAL: healthy, alert, no distress and obese  EYES: Eyes grossly normal to inspection, PERRL and conjunctivae and sclerae normal  HENT: ear canals and TM's normal, nose and mouth without ulcers or lesions  NECK: no adenopathy, no asymmetry, masses, or scars and thyroid normal to palpation  RESP: lungs clear to auscultation - no rales, rhonchi or wheezes  BREAST: normal without masses, tenderness or nipple discharge and no palpable axillary masses or adenopathy  CV: regular rate and rhythm, normal S1 S2, no S3 or S4, no murmur, click or rub, no peripheral edema and peripheral pulses strong  ABDOMEN: soft, nontender, no hepatosplenomegaly, no masses and bowel sounds normal  MS: no gross musculoskeletal defects noted, no edema  SKIN: no suspicious lesions or rashes  NEURO: Normal strength and tone, mentation " intact and speech normal  PSYCH: mentation appears normal, affect normal/bright      ASSESSMENT/PLAN:   Yudelka was seen today for physical.    Diagnoses and all orders for this visit:    Routine general medical examination at a health care facility  -     CBC with platelets; Future  -     Comprehensive metabolic panel (BMP + Alb, Alk Phos, ALT, AST, Total. Bili, TP); Future  -     CBC with platelets  -     Comprehensive metabolic panel (BMP + Alb, Alk Phos, ALT, AST, Total. Bili, TP)    Need for hepatitis C screening test  - declined  Screening for diabetes mellitus  -     Hemoglobin A1c; Future  -     Hemoglobin A1c    Class 3 severe obesity due to excess calories without serious comorbidity with body mass index (BMI) of 45.0 to 49.9 in adult (H)  -     Hemoglobin A1c; Future  -     Lipid panel reflex to direct LDL Fasting; Future  -     TSH with free T4 reflex; Future  -     Semaglutide-Weight Management (WEGOVY) 0.25 MG/0.5ML pen; Inject 0.25 mg Subcutaneous once a week  -     Hemoglobin A1c  -     Lipid panel reflex to direct LDL Fasting  -     TSH with free T4 reflex  - will start wegovy and then increase as tolerated monthly, discussed weight loss surgery as well and she is not interested  Encounter for vitamin deficiency screening  -     Vitamin D Deficiency; Future  -     Vitamin D Deficiency    Abnormal facial hair  -     spironolactone (ALDACTONE) 25 MG tablet; Take 1 tablet (25 mg) by mouth daily  - trial of aldactone, may increase as needed up to 200 mg  Other orders  -     REVIEW OF HEALTH MAINTENANCE PROTOCOL ORDERS  -     PRIMARY CARE FOLLOW-UP SCHEDULING; Future        Patient has been advised of split billing requirements and indicates understanding: Yes      COUNSELING:  Reviewed preventive health counseling, as reflected in patient instructions       Regular exercise       Healthy diet/nutrition      BMI:   Estimated body mass index is 49.42 kg/m  as calculated from the following:    Height as  "of this encounter: 1.708 m (5' 7.25\").    Weight as of this encounter: 144.2 kg (317 lb 14.4 oz).   Weight management plan: Discussed healthy diet and exercise guidelines wegovamado      She reports that she has never smoked. She has never used smokeless tobacco.      Alexandra Granados NP  [facility]  "

## 2023-05-13 LAB — DEPRECATED CALCIDIOL+CALCIFEROL SERPL-MC: 23 UG/L (ref 20–75)

## 2023-05-16 ENCOUNTER — TELEPHONE (OUTPATIENT)
Dept: FAMILY MEDICINE | Facility: CLINIC | Age: 38
End: 2023-05-16
Payer: COMMERCIAL

## 2023-05-16 NOTE — TELEPHONE ENCOUNTER
Central Prior Authorization Team   Phone: [phone]      PRIOR AUTHORIZATION DENIED    Medication: Semaglutide-Weight Management (WEGOVY) 0.25 MG/0.5ML pen - EPA DENIED    Denial Date: 5/16/2023    Denial Rational: REQUIRES THREE MONTH TRIAL OF BEHAVIORAL MODIFICATION AND DIETARY RESTRICTION      Appeal Information: IF PROVIDER WOULD LIKE TO APPEAL THIS DECISION PLEASE PROVIDE PA TEAM WITH LETTER OF MEDICAL NECESSITY

## 2023-09-11 ENCOUNTER — MYC MEDICAL ADVICE (OUTPATIENT)
Dept: FAMILY MEDICINE | Facility: CLINIC | Age: 38
End: 2023-09-11
Payer: COMMERCIAL

## 2023-09-11 DIAGNOSIS — L67.8 ABNORMAL FACIAL HAIR: ICD-10-CM

## 2023-09-11 RX ORDER — SPIRONOLACTONE 25 MG/1
25 TABLET ORAL DAILY
Qty: 90 TABLET | Refills: 3 | Status: SHIPPED | OUTPATIENT
Start: 2023-09-11 | End: 2023-09-12

## 2023-09-12 RX ORDER — SPIRONOLACTONE 25 MG/1
25 TABLET ORAL DAILY
Qty: 90 TABLET | Refills: 3 | Status: SHIPPED | OUTPATIENT
Start: 2023-09-12 | End: 2024-08-28

## 2024-07-13 ENCOUNTER — HEALTH MAINTENANCE LETTER (OUTPATIENT)
Age: 39
End: 2024-07-13

## 2024-08-28 ENCOUNTER — OFFICE VISIT (OUTPATIENT)
Dept: FAMILY MEDICINE | Facility: CLINIC | Age: 39
End: 2024-08-28
Payer: COMMERCIAL

## 2024-08-28 VITALS
TEMPERATURE: 98.1 F | OXYGEN SATURATION: 98 % | HEART RATE: 74 BPM | HEIGHT: 67 IN | DIASTOLIC BLOOD PRESSURE: 84 MMHG | BODY MASS INDEX: 45.99 KG/M2 | RESPIRATION RATE: 20 BRPM | SYSTOLIC BLOOD PRESSURE: 140 MMHG | WEIGHT: 293 LBS

## 2024-08-28 DIAGNOSIS — L67.8 ABNORMAL FACIAL HAIR: Primary | ICD-10-CM

## 2024-08-28 PROCEDURE — 36415 COLL VENOUS BLD VENIPUNCTURE: CPT | Performed by: PHYSICIAN ASSISTANT

## 2024-08-28 PROCEDURE — 80053 COMPREHEN METABOLIC PANEL: CPT | Performed by: PHYSICIAN ASSISTANT

## 2024-08-28 PROCEDURE — 99213 OFFICE O/P EST LOW 20 MIN: CPT | Performed by: PHYSICIAN ASSISTANT

## 2024-08-28 PROCEDURE — G2211 COMPLEX E/M VISIT ADD ON: HCPCS | Performed by: PHYSICIAN ASSISTANT

## 2024-08-28 RX ORDER — SPIRONOLACTONE 50 MG/1
50 TABLET, FILM COATED ORAL 2 TIMES DAILY
Qty: 180 TABLET | Refills: 3 | Status: SHIPPED | OUTPATIENT
Start: 2024-08-28

## 2024-08-28 NOTE — PROGRESS NOTES
"  Assessment & Plan     Abnormal facial hair    Rechecking labs today. After looking up dosing, looks like patient was on a sub-therapeutic dose which may be why she felt it wasn't working as well. Will increase dose to recommended 50 mg bid. She will schedule a physical.    - Comprehensive metabolic panel (BMP + Alb, Alk Phos, ALT, AST, Total. Bili, TP); Future  - spironolactone (ALDACTONE) 50 MG tablet; Take 1 tablet (50 mg) by mouth 2 times daily.  - Comprehensive metabolic panel (BMP + Alb, Alk Phos, ALT, AST, Total. Bili, TP)      The longitudinal plan of care for the diagnosis(es)/condition(s) as documented were addressed during this visit. Due to the added complexity in care, I will continue to support Yudelka in the subsequent management and with ongoing continuity of care.          Subjective   Yudelka is a 38 year old, presenting for the following health issues:  Recheck Medication        8/28/2024     3:10 PM   Additional Questions   Roomed by Roxann Gomez     History of Present Illness       Reason for visit:  Medication refill   She is taking medications regularly.         Medication Followup of Spironolactone  Taking Medication as prescribed: yes  Side Effects:  None  Medication Helping Symptoms:  it was helping more in the beginning but not as helpful anymore        Review of Systems  Constitutional, HEENT, cardiovascular, pulmonary, gi and gu systems are negative, except as otherwise noted.        Objective    BP (!) 140/84 (BP Location: Right arm, Patient Position: Sitting, Cuff Size: Adult Large)   Pulse 74   Temp 98.1  F (36.7  C) (Oral)   Resp 20   Ht 1.702 m (5' 7\")   Wt 142.4 kg (314 lb)   SpO2 98%   BMI 49.18 kg/m    Body mass index is 49.18 kg/m .      Physical Exam   GENERAL: alert and no distress  EYES: Eyes grossly normal to inspection, PERRL and conjunctivae and sclerae normal  MS: no gross musculoskeletal defects noted, no edema  SKIN: no suspicious lesions or rashes  NEURO: " Normal strength and tone, mentation intact and speech normal  PSYCH: mentation appears normal, affect normal/bright            Signed Electronically by: Dl Ventura PA-C

## 2024-08-29 LAB
ALBUMIN SERPL BCG-MCNC: 4.2 G/DL (ref 3.5–5.2)
ALP SERPL-CCNC: 93 U/L (ref 40–150)
ALT SERPL W P-5'-P-CCNC: 17 U/L (ref 0–50)
ANION GAP SERPL CALCULATED.3IONS-SCNC: 12 MMOL/L (ref 7–15)
AST SERPL W P-5'-P-CCNC: 17 U/L (ref 0–45)
BILIRUB SERPL-MCNC: 0.3 MG/DL
BUN SERPL-MCNC: 13 MG/DL (ref 6–20)
CALCIUM SERPL-MCNC: 9.4 MG/DL (ref 8.8–10.4)
CHLORIDE SERPL-SCNC: 104 MMOL/L (ref 98–107)
CREAT SERPL-MCNC: 0.69 MG/DL (ref 0.51–0.95)
EGFRCR SERPLBLD CKD-EPI 2021: >90 ML/MIN/1.73M2
GLUCOSE SERPL-MCNC: 81 MG/DL (ref 70–99)
HCO3 SERPL-SCNC: 22 MMOL/L (ref 22–29)
POTASSIUM SERPL-SCNC: 4.5 MMOL/L (ref 3.4–5.3)
PROT SERPL-MCNC: 7.7 G/DL (ref 6.4–8.3)
SODIUM SERPL-SCNC: 138 MMOL/L (ref 135–145)

## 2024-11-24 ENCOUNTER — HEALTH MAINTENANCE LETTER (OUTPATIENT)
Age: 39
End: 2024-11-24

## 2025-01-02 NOTE — NURSING NOTE
"Chief Complaint   Patient presents with     Follow Up     PCOS follow up.        Initial /70   Pulse 88   Ht 1.702 m (5' 7\")   Wt 131.1 kg (289 lb)   LMP 2019   Breastfeeding? No   BMI 45.26 kg/m   Estimated body mass index is 45.26 kg/m  as calculated from the following:    Height as of this encounter: 1.702 m (5' 7\").    Weight as of this encounter: 131.1 kg (289 lb).  BP completed using cuff size: large    Questioned patient about current smoking habits.  Pt. has never smoked.          The following HM Due: NONE      The following patient reported/Care Every where data was sent to:  P ABSTRACT QUALITY INITIATIVES [[id number]]    Beth Johnson LPN               " Is This A New Presentation, Or A Follow-Up?: Skin Lesions How Severe Is Your Skin Lesion?: mild Have Your Skin Lesions Been Treated?: not been treated

## 2025-03-19 ENCOUNTER — ANCILLARY PROCEDURE (OUTPATIENT)
Dept: MAMMOGRAPHY | Facility: CLINIC | Age: 40
End: 2025-03-19
Payer: COMMERCIAL

## 2025-03-19 DIAGNOSIS — Z12.31 VISIT FOR SCREENING MAMMOGRAM: ICD-10-CM

## 2025-03-19 PROCEDURE — 77067 SCR MAMMO BI INCL CAD: CPT | Mod: TC | Performed by: RADIOLOGY

## 2025-03-19 PROCEDURE — 77063 BREAST TOMOSYNTHESIS BI: CPT | Mod: TC | Performed by: RADIOLOGY

## 2025-03-28 SDOH — HEALTH STABILITY: PHYSICAL HEALTH: ON AVERAGE, HOW MANY DAYS PER WEEK DO YOU ENGAGE IN MODERATE TO STRENUOUS EXERCISE (LIKE A BRISK WALK)?: 2 DAYS

## 2025-03-28 SDOH — HEALTH STABILITY: PHYSICAL HEALTH: ON AVERAGE, HOW MANY MINUTES DO YOU ENGAGE IN EXERCISE AT THIS LEVEL?: 30 MIN

## 2025-03-28 ASSESSMENT — SOCIAL DETERMINANTS OF HEALTH (SDOH): HOW OFTEN DO YOU GET TOGETHER WITH FRIENDS OR RELATIVES?: ONCE A WEEK

## 2025-04-02 ENCOUNTER — OFFICE VISIT (OUTPATIENT)
Dept: PEDIATRICS | Facility: CLINIC | Age: 40
End: 2025-04-02
Payer: COMMERCIAL

## 2025-04-02 VITALS
SYSTOLIC BLOOD PRESSURE: 126 MMHG | BODY MASS INDEX: 45.99 KG/M2 | TEMPERATURE: 98 F | WEIGHT: 293 LBS | OXYGEN SATURATION: 99 % | HEART RATE: 75 BPM | RESPIRATION RATE: 18 BRPM | DIASTOLIC BLOOD PRESSURE: 85 MMHG | HEIGHT: 67 IN

## 2025-04-02 DIAGNOSIS — E66.813 CLASS 3 SEVERE OBESITY DUE TO EXCESS CALORIES WITHOUT SERIOUS COMORBIDITY WITH BODY MASS INDEX (BMI) OF 50.0 TO 59.9 IN ADULT (H): ICD-10-CM

## 2025-04-02 DIAGNOSIS — Z00.00 ROUTINE GENERAL MEDICAL EXAMINATION AT A HEALTH CARE FACILITY: Primary | ICD-10-CM

## 2025-04-02 DIAGNOSIS — E66.01 CLASS 3 SEVERE OBESITY DUE TO EXCESS CALORIES WITHOUT SERIOUS COMORBIDITY WITH BODY MASS INDEX (BMI) OF 50.0 TO 59.9 IN ADULT (H): ICD-10-CM

## 2025-04-02 DIAGNOSIS — F41.1 GAD (GENERALIZED ANXIETY DISORDER): ICD-10-CM

## 2025-04-02 PROCEDURE — 3079F DIAST BP 80-89 MM HG: CPT | Performed by: INTERNAL MEDICINE

## 2025-04-02 PROCEDURE — 1125F AMNT PAIN NOTED PAIN PRSNT: CPT | Performed by: INTERNAL MEDICINE

## 2025-04-02 PROCEDURE — 99396 PREV VISIT EST AGE 40-64: CPT | Mod: 25 | Performed by: INTERNAL MEDICINE

## 2025-04-02 PROCEDURE — 90471 IMMUNIZATION ADMIN: CPT | Performed by: INTERNAL MEDICINE

## 2025-04-02 PROCEDURE — G2211 COMPLEX E/M VISIT ADD ON: HCPCS | Performed by: INTERNAL MEDICINE

## 2025-04-02 PROCEDURE — 99213 OFFICE O/P EST LOW 20 MIN: CPT | Mod: 25 | Performed by: INTERNAL MEDICINE

## 2025-04-02 PROCEDURE — 90746 HEPB VACCINE 3 DOSE ADULT IM: CPT | Performed by: INTERNAL MEDICINE

## 2025-04-02 PROCEDURE — 3074F SYST BP LT 130 MM HG: CPT | Performed by: INTERNAL MEDICINE

## 2025-04-02 RX ORDER — ESCITALOPRAM OXALATE 5 MG/1
TABLET ORAL
Qty: 70 TABLET | Refills: 0 | Status: SHIPPED | OUTPATIENT
Start: 2025-04-02 | End: 2025-05-14

## 2025-04-02 ASSESSMENT — PAIN SCALES - GENERAL: PAINLEVEL_OUTOF10: MILD PAIN (2)

## 2025-04-02 NOTE — PROGRESS NOTES
"Preventive Care Visit  [facility] KHADAR Tapia MD, Internal Medicine  Apr 2, 2025      Assessment & Plan     (Z00.00) Routine general medical examination at a health care facility  (primary encounter diagnosis)  Comment: routine health maintenance. Up to date. Needs pap, will get at follow up.     (F41.1) MARGARITA (generalized anxiety disorder)  Comment: generalized anxiety, with intermittent panic attacks (weekly at least). Interested in therapy and medication. Discussed expected benefits and possible side effects. Follow up in 4 weeks.   Plan: Adult Mental Health  Referral,         escitalopram (LEXAPRO) 5 MG tablet            (E66.813,  Z68.43,  E66.01) Class 3 severe obesity due to excess calories without serious comorbidity with body mass index (BMI) of 50.0 to 59.9 in adult (H)  Comment: Would like to lose weight to be more active with her kids and feel better (more energy).  Interested in lifestyle modifications. Discussed reducing processed foods, increasing vegetables, increasing activity.  Will follow up in 4 weeks.   Plan: Adult Comprehensive Weight Management         Referral              The longitudinal plan of care for the diagnosis(es)/condition(s) as documented were addressed during this visit. Due to the added complexity in care, I will continue to support Yudelka in the subsequent management and with ongoing continuity of care.       BMI  Estimated body mass index is 50.09 kg/m  as calculated from the following:    Height as of this encounter: 1.702 m (5' 7\").    Weight as of this encounter: 145.1 kg (319 lb 12.8 oz).   Weight management plan: Discussed healthy diet and exercise guidelines    Counseling  Appropriate preventive services were addressed with this patient via screening, questionnaire, or discussion as appropriate for fall prevention, nutrition, physical activity, Tobacco-use cessation, social engagement, weight loss and cognition.  Checklist " reviewing preventive services available has been given to the patient.  Reviewed patient's diet, addressing concerns and/or questions.   She is at risk for lack of exercise and has been provided with information to increase physical activity for the benefit of her well-being.   She is at risk for psychosocial distress and has been provided with information to reduce risk.           Stan Jha is a 39 year old, presenting for the following:  Physical        4/2/2025    10:20 AM   Additional Questions   Roomed by Pau Delong   Accompanied by N/A          BRAYAN  Yudelka is here for a preventive health visit.  Overall, she is doing well with the following concerns:      Interested in losing weight.    Weighed 180 is high school. Was a  (volleyball), weighed 160. 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 Prior to pregnancy: 240, 280.   Eats fast food occasionally, lots of red meat, vegetables. Has tried weight watchers, keto. Nothing particularly effective.   Activity: exercises 2-3 times per week. Walks 1-2 miles. More active through the day in the summer.   Has 2 kids, in school.  is overweight. Family meals. Walks a lot at work.   Goals: to be able to move better. Wants better energy, would like to get back to 180.   Most interested in lifestyle.   Right knee bothering her.               Advance Care Planning  Patient does not have a Health Care Directive: Discussed advance care planning with patient; information given to patient to review.      3/28/2025   General Health   How would you rate your overall physical health? Good   Feel stress (tense, anxious, or unable to sleep) To some extent   (!) STRESS CONCERN      3/28/2025   Nutrition   Three or more servings of calcium each day? Yes   Diet: Regular (no restrictions)   How many servings of fruit and vegetables per day? (!) 2-3   How many sweetened beverages each day? 0-1         3/28/2025   Exercise   Days per week of  moderate/strenous exercise 2 days   Average minutes spent exercising at this level 30 min   (!) EXERCISE CONCERN      3/28/2025   Social Factors   Frequency of gathering with friends or relatives Once a week   Worry food won't last until get money to buy more No   Food not last or not have enough money for food? No   Do you have housing? (Housing is defined as stable permanent housing and does not include staying ouside in a car, in a tent, in an abandoned building, in an overnight shelter, or couch-surfing.) Yes   Are you worried about losing your housing? No   Lack of transportation? No   Unable to get utilities (heat,electricity)? No         3/28/2025   Dental   Dentist two times every year? Yes       Today's PHQ-2 Score:       4/2/2025     7:14 AM   PHQ-2 ( 1999 [employer])   Q1: Little interest or pleasure in doing things 0   Q2: Feeling down, depressed or hopeless 0   PHQ-2 Score 0    Q1: Little interest or pleasure in doing things Not at all   Q2: Feeling down, depressed or hopeless Not at all   PHQ-2 Score 0       Patient-reported           3/28/2025   Substance Use   Alcohol more than 3/day or more than 7/wk No   Do you use any other substances recreationally? No     Social History     Tobacco Use    Smoking status: Never     Passive exposure: Never    Smokeless tobacco: Never   Vaping Use    Vaping status: Never Used   Substance Use Topics    Alcohol use: No    Drug use: No           3/19/2025   LAST FHS-7 RESULTS   1st degree relative breast or ovarian cancer No   Any relative bilateral breast cancer No   Any male have breast cancer No   Any ONE woman have BOTH breast AND ovarian cancer No   Any woman with breast cancer before 50yrs No   2 or more relatives with breast AND/OR ovarian cancer No   2 or more relatives with breast AND/OR bowel cancer No                3/28/2025   STI Screening   New sexual partner(s) since last STI/HIV test? No     History of abnormal Pap smear: No - age 30- 64 PAP with HPV every  "5 years recommended        Latest Ref Rng & Units 6/21/2019    11:06 AM 6/21/2019    10:15 AM 6/23/2016    12:00 AM   PAP / HPV   PAP (Historical)  NIL   NIL    HPV 16 DNA NEG^Negative  Negative     HPV 18 DNA NEG^Negative  Negative     Other HR HPV NEG^Negative  Negative       ASCVD Risk   The 10-year ASCVD risk score (Jimmy RHODES, et al., 2019) is: 0.6%    Values used to calculate the score:      Age: 39 years      Sex: Female      Is Non- : No      Diabetic: No      Tobacco smoker: No      Systolic Blood Pressure: 126 mmHg      Is BP treated: No      HDL Cholesterol: 46 mg/dL      Total Cholesterol: 173 mg/dL        3/28/2025   Contraception/Family Planning   Questions about contraception or family planning No        Reviewed and updated as needed this visit by Provider   Tobacco  Allergies  Meds  Problems  Med Hx  Surg Hx  Fam Hx                     Objective    Exam  /85 (BP Location: Right arm, Patient Position: Sitting, Cuff Size: Adult Regular)   Pulse 75   Temp 98  F (36.7  C) (Temporal)   Resp 18   Ht 1.702 m (5' 7\")   Wt (!) 145.1 kg (319 lb 12.8 oz)   LMP 03/30/2025 (Approximate)   SpO2 99%   BMI 50.09 kg/m     Estimated body mass index is 50.09 kg/m  as calculated from the following:    Height as of this encounter: 1.702 m (5' 7\").    Weight as of this encounter: 145.1 kg (319 lb 12.8 oz).    Physical Exam  GENERAL: alert and no distress  EYES: Eyes grossly normal to inspection, PERRL and conjunctivae and sclerae normal  HENT: ear canals and TM's normal, nose and mouth without ulcers or lesions  RESP: lungs clear to auscultation - no rales, rhonchi or wheezes  CV: regular rate and rhythm, normal S1 S2, no S3 or S4, no murmur, click or rub, no peripheral edema  MS: no gross musculoskeletal defects noted, no edema  SKIN: no suspicious lesions or rashes  NEURO: Normal strength and tone, mentation intact and speech normal  PSYCH: mentation appears normal, " affect normal/bright        Signed Electronically by: Karla Tapia MD

## 2025-04-02 NOTE — PATIENT INSTRUCTIONS
Patient Education   Preventive Care Advice   This is general advice given by our system to help you stay healthy. However, your care team may have specific advice just for you. Please talk to your care team about your preventive care needs.  Nutrition  Eat 5 or more servings of fruits and vegetables each day.  Try wheat bread, brown rice and whole grain pasta (instead of white bread, rice, and pasta).  Get enough calcium and vitamin D. Check the label on foods and aim for 100% of the RDA (recommended daily allowance).  Lifestyle  Exercise at least 150 minutes each week  (30 minutes a day, 5 days a week).  Do muscle strengthening activities 2 days a week. These help control your weight and prevent disease.  No smoking.  Wear sunscreen to prevent skin cancer.  Have a dental exam and cleaning every 6 months.  Yearly exams  See your health care team every year to talk about:  Any changes in your health.  Any medicines your care team has prescribed.  Preventive care, family planning, and ways to prevent chronic diseases.  Shots (vaccines)   HPV shots (up to age 26), if you've never had them before.  Hepatitis B shots (up to age 59), if you've never had them before.  COVID-19 shot: Get this shot when it's due.  Flu shot: Get a flu shot every year.  Tetanus shot: Get a tetanus shot every 10 years.  Pneumococcal, hepatitis A, and RSV shots: Ask your care team if you need these based on your risk.  Shingles shot (for age 50 and up)  General health tests  Diabetes screening:  Starting at age 35, Get screened for diabetes at least every 3 years.  If you are younger than age 35, ask your care team if you should be screened for diabetes.  Cholesterol test: At age 39, start having a cholesterol test every 5 years, or more often if advised.  Bone density scan (DEXA): At age 50, ask your care team if you should have this scan for osteoporosis (brittle bones).  Hepatitis C: Get tested at least once in your life.  STIs (sexually  transmitted infections)  Before age 24: Ask your care team if you should be screened for STIs.  After age 24: Get screened for STIs if you're at risk. You are at risk for STIs (including HIV) if:  You are sexually active with more than one person.  You don't use condoms every time.  You or a partner was diagnosed with a sexually transmitted infection.  If you are at risk for HIV, ask about PrEP medicine to prevent HIV.  Get tested for HIV at least once in your life, whether you are at risk for HIV or not.  Cancer screening tests  Cervical cancer screening: If you have a cervix, begin getting regular cervical cancer screening tests starting at age 21.  Breast cancer scan (mammogram): If you've ever had breasts, begin having regular mammograms starting at age 40. This is a scan to check for breast cancer.  Colon cancer screening: It is important to start screening for colon cancer at age 45.  Have a colonoscopy test every 10 years (or more often if you're at risk) Or, ask your provider about stool tests like a FIT test every year or Cologuard test every 3 years.  To learn more about your testing options, visit:   .  For help making a decision, visit:   https://bit.ly/eq69750.  Prostate cancer screening test: If you have a prostate, ask your care team if a prostate cancer screening test (PSA) at age 55 is right for you.  Lung cancer screening: If you are a current or former smoker ages 50 to 80, ask your care team if ongoing lung cancer screenings are right for you.  For informational purposes only. Not to replace the advice of your health care provider. Copyright   2023 [facility]. All rights reserved. Clinically reviewed by the [facility] Transitions Program. [employer] 137023 - REV 01/24.  Learning About Stress  What is stress?     Stress is your body's response to a hard situation. Your body can have a physical, emotional, or mental response. Stress is a fact of life for most people, and it  affects everyone differently. What causes stress for you may not be stressful for someone else.  A lot of things can cause stress. You may feel stress when you go on a job interview, take a test, or run a race. This kind of short-term stress is normal and even useful. It can help you if you need to work hard or react quickly. For example, stress can help you finish an important job on time.  Long-term stress is caused by ongoing stressful situations or events. Examples of long-term stress include long-term health problems, ongoing problems at work, or conflicts in your family. Long-term stress can harm your health.  How does stress affect your health?  When you are stressed, your body responds as though you are in danger. It makes hormones that speed up your heart, make you breathe faster, and give you a burst of energy. This is called the fight-or-flight stress response. If the stress is over quickly, your body goes back to normal and no harm is done.  But if stress happens too often or lasts too long, it can have bad effects. Long-term stress can make you more likely to get sick, and it can make symptoms of some diseases worse. If you tense up when you are stressed, you may develop neck, shoulder, or low back pain. Stress is linked to high blood pressure and heart disease.  Stress also harms your emotional health. It can make you santizo, tense, or depressed. Your relationships may suffer, and you may not do well at work or school.  What can you do to manage stress?  You can try these things to help manage stress:   Do something active. Exercise or activity can help reduce stress. Walking is a great way to get started. Even everyday activities such as housecleaning or yard work can help.  Try yoga or radha chi. These techniques combine exercise and meditation. You may need some training at first to learn them.  Do something you enjoy. For example, listen to music or go to a movie. Practice your hobby or do volunteer  "work.  Meditate. This can help you relax, because you are not worrying about what happened before or what may happen in the future.  Do guided imagery. Imagine yourself in any setting that helps you feel calm. You can use online videos, books, or a teacher to guide you.  Do breathing exercises. For example:  From a standing position, bend forward from the waist with your knees slightly bent. Let your arms dangle close to the floor.  Breathe in slowly and deeply as you return to a standing position. Roll up slowly and lift your head last.  Hold your breath for just a few seconds in the standing position.  Breathe out slowly and bend forward from the waist.  Let your feelings out. Talk, laugh, cry, and express anger when you need to. Talking with supportive friends or family, a counselor, or a dominick leader about your feelings is a healthy way to relieve stress. Avoid discussing your feelings with people who make you feel worse.  Write. It may help to write about things that are bothering you. This helps you find out how much stress you feel and what is causing it. When you know this, you can find better ways to cope.  What can you do to prevent stress?  You might try some of these things to help prevent stress:  Manage your time. This helps you find time to do the things you want and need to do.  Get enough sleep. Your body recovers from the stresses of the day while you are sleeping.  Get support. Your family, friends, and community can make a difference in how you experience stress.  Limit your news feed. Avoid or limit time on social media or news that may make you feel stressed.  Do something active. Exercise or activity can help reduce stress. Walking is a great way to get started.  Where can you learn more?  Go to https://www.[employer].net/patiented  Enter N032 in the search box to learn more about \"Learning About Stress.\"  Current as of: October 24, 2024  Content Version: 14.4 2024-2025 [employer].   Care instructions adapted under license by your healthcare professional. If you have questions about a medical condition or this instruction, always ask your healthcare professional. [employer] disclaims any warranty or liability for your use of this information.

## 2025-05-05 ASSESSMENT — ANXIETY QUESTIONNAIRES
1. FEELING NERVOUS, ANXIOUS, OR ON EDGE: SEVERAL DAYS
2. NOT BEING ABLE TO STOP OR CONTROL WORRYING: SEVERAL DAYS
GAD7 TOTAL SCORE: 6
7. FEELING AFRAID AS IF SOMETHING AWFUL MIGHT HAPPEN: SEVERAL DAYS
8. IF YOU CHECKED OFF ANY PROBLEMS, HOW DIFFICULT HAVE THESE MADE IT FOR YOU TO DO YOUR WORK, TAKE CARE OF THINGS AT HOME, OR GET ALONG WITH OTHER PEOPLE?: NOT DIFFICULT AT ALL
3. WORRYING TOO MUCH ABOUT DIFFERENT THINGS: SEVERAL DAYS
5. BEING SO RESTLESS THAT IT IS HARD TO SIT STILL: NOT AT ALL
6. BECOMING EASILY ANNOYED OR IRRITABLE: SEVERAL DAYS
GAD7 TOTAL SCORE: 6
IF YOU CHECKED OFF ANY PROBLEMS ON THIS QUESTIONNAIRE, HOW DIFFICULT HAVE THESE PROBLEMS MADE IT FOR YOU TO DO YOUR WORK, TAKE CARE OF THINGS AT HOME, OR GET ALONG WITH OTHER PEOPLE: NOT DIFFICULT AT ALL
GAD7 TOTAL SCORE: 6
4. TROUBLE RELAXING: SEVERAL DAYS
7. FEELING AFRAID AS IF SOMETHING AWFUL MIGHT HAPPEN: SEVERAL DAYS

## 2025-05-06 ENCOUNTER — OFFICE VISIT (OUTPATIENT)
Dept: PEDIATRICS | Facility: CLINIC | Age: 40
End: 2025-05-06
Payer: COMMERCIAL

## 2025-05-06 VITALS
DIASTOLIC BLOOD PRESSURE: 82 MMHG | RESPIRATION RATE: 18 BRPM | HEART RATE: 65 BPM | OXYGEN SATURATION: 99 % | HEIGHT: 67 IN | SYSTOLIC BLOOD PRESSURE: 124 MMHG | TEMPERATURE: 96.9 F | WEIGHT: 293 LBS | BODY MASS INDEX: 45.99 KG/M2

## 2025-05-06 DIAGNOSIS — Z12.4 CERVICAL CANCER SCREENING: ICD-10-CM

## 2025-05-06 DIAGNOSIS — F41.1 GAD (GENERALIZED ANXIETY DISORDER): Primary | ICD-10-CM

## 2025-05-06 PROCEDURE — 3074F SYST BP LT 130 MM HG: CPT | Performed by: INTERNAL MEDICINE

## 2025-05-06 PROCEDURE — 87624 HPV HI-RISK TYP POOLED RSLT: CPT | Performed by: INTERNAL MEDICINE

## 2025-05-06 PROCEDURE — 99213 OFFICE O/P EST LOW 20 MIN: CPT | Performed by: INTERNAL MEDICINE

## 2025-05-06 PROCEDURE — G2211 COMPLEX E/M VISIT ADD ON: HCPCS | Performed by: INTERNAL MEDICINE

## 2025-05-06 PROCEDURE — 3079F DIAST BP 80-89 MM HG: CPT | Performed by: INTERNAL MEDICINE

## 2025-05-06 PROCEDURE — 1126F AMNT PAIN NOTED NONE PRSNT: CPT | Performed by: INTERNAL MEDICINE

## 2025-05-06 ASSESSMENT — PAIN SCALES - GENERAL: PAINLEVEL_OUTOF10: NO PAIN (0)

## 2025-05-06 NOTE — PROGRESS NOTES
Assessment & Plan     (F41.1) MARGARITA (generalized anxiety disorder)  (primary encounter diagnosis)  Comment: on 5 mg and then 10 mg escitalopram, she had no improvement but sleep is very disrupted.  Rather than uptitrating this (for mood benefit) and possibly worsening sleep, will transition to another selective serotonin reuptake inhibitor.  Direct transition planned.   Plan: sertraline (ZOLOFT) 50 MG tablet            (Z12.4) Cervical cancer screening  Comment: routine  Plan: HPV and Gynecologic Cytology Panel -         Recommended Age 30 - 65 Years            The longitudinal plan of care for the diagnosis(es)/condition(s) as documented were addressed during this visit. Due to the added complexity in care, I will continue to support Yudelka in the subsequent management and with ongoing continuity of care.               Subjective   Yudelka is a 39 year old, presenting for the following health issues:  Gyn Exam and Recheck Medication        5/6/2025     9:39 AM   Additional Questions   Roomed by Pau Delong   Accompanied by N/A     History of Present Illness       Mental Health Follow-up:  Patient presents to follow-up on Depression & Anxiety.Patient's depression since last visit has been:  Medium  The patient is not having other symptoms associated with depression.  Patient's anxiety since last visit has been:  Medium  The patient is not having other symptoms associated with anxiety.  Any significant life events: No  Patient is not feeling anxious or having panic attacks.  Patient has no concerns about alcohol or drug use.    She eats 2-3 servings of fruits and vegetables daily.She consumes 1 sweetened beverage(s) daily.She exercises with enough effort to increase her heart rate 10 to 19 minutes per day.  She exercises with enough effort to increase her heart rate 3 or less days per week.   She is taking medications regularly.      Has been on lexapro 5 mg for 2 weeks, and 10 mg for 2 weeks. Doesn't feel any  "change in mood - no improvement.     Sleep has been disrupted - the medication makes her sleepy and so she takes it at night, but she doesn't sleep through the night like she used to.                   Objective    /82 (BP Location: Right arm, Patient Position: Sitting, Cuff Size: Adult Large)   Pulse 65   Temp 96.9  F (36.1  C) (Temporal)   Resp 18   Ht 1.702 m (5' 7\")   Wt (!) 142.5 kg (314 lb 1.6 oz)   LMP 03/30/2025 (Approximate)   SpO2 99%   BMI 49.20 kg/m    Body mass index is 49.2 kg/m .  Physical Exam   GENERAL: alert and no distress   (female): normal female external genitalia, normal urethral meatus, normal vaginal mucosa  NEURO: Normal strength and tone, mentation intact and speech normal  PSYCH: mentation appears normal and affect normal/bright            Signed Electronically by: Karla Tapia MD    "

## 2025-05-07 ENCOUNTER — RESULTS FOLLOW-UP (OUTPATIENT)
Dept: OBGYN | Facility: CLINIC | Age: 40
End: 2025-05-07

## 2025-05-07 LAB
HPV HR 12 DNA CVX QL NAA+PROBE: NEGATIVE
HPV16 DNA CVX QL NAA+PROBE: NEGATIVE
HPV18 DNA CVX QL NAA+PROBE: NEGATIVE
HUMAN PAPILLOMA VIRUS FINAL DIAGNOSIS: NORMAL

## 2025-06-04 ASSESSMENT — ANXIETY QUESTIONNAIRES
GAD7 TOTAL SCORE: 4
7. FEELING AFRAID AS IF SOMETHING AWFUL MIGHT HAPPEN: NOT AT ALL
IF YOU CHECKED OFF ANY PROBLEMS ON THIS QUESTIONNAIRE, HOW DIFFICULT HAVE THESE PROBLEMS MADE IT FOR YOU TO DO YOUR WORK, TAKE CARE OF THINGS AT HOME, OR GET ALONG WITH OTHER PEOPLE: NOT DIFFICULT AT ALL
GAD7 TOTAL SCORE: 4
2. NOT BEING ABLE TO STOP OR CONTROL WORRYING: NOT AT ALL
GAD7 TOTAL SCORE: 4
6. BECOMING EASILY ANNOYED OR IRRITABLE: SEVERAL DAYS
1. FEELING NERVOUS, ANXIOUS, OR ON EDGE: NOT AT ALL
8. IF YOU CHECKED OFF ANY PROBLEMS, HOW DIFFICULT HAVE THESE MADE IT FOR YOU TO DO YOUR WORK, TAKE CARE OF THINGS AT HOME, OR GET ALONG WITH OTHER PEOPLE?: NOT DIFFICULT AT ALL
4. TROUBLE RELAXING: SEVERAL DAYS
7. FEELING AFRAID AS IF SOMETHING AWFUL MIGHT HAPPEN: NOT AT ALL
5. BEING SO RESTLESS THAT IT IS HARD TO SIT STILL: SEVERAL DAYS
3. WORRYING TOO MUCH ABOUT DIFFERENT THINGS: SEVERAL DAYS

## 2025-06-05 ENCOUNTER — VIRTUAL VISIT (OUTPATIENT)
Dept: PEDIATRICS | Facility: CLINIC | Age: 40
End: 2025-06-05
Payer: COMMERCIAL

## 2025-06-05 DIAGNOSIS — F41.1 GAD (GENERALIZED ANXIETY DISORDER): ICD-10-CM

## 2025-06-05 RX ORDER — SERTRALINE HYDROCHLORIDE 100 MG/1
100 TABLET, FILM COATED ORAL DAILY
Qty: 90 TABLET | Refills: 1 | Status: SHIPPED | OUTPATIENT
Start: 2025-06-05

## 2025-06-05 NOTE — PROGRESS NOTES
Yudelka is a 39 year old who is being evaluated via a billable video visit.          Assessment & Plan     (F41.1) MARGARITA (generalized anxiety disorder)  Comment: Increase sertraline from 50 mg to 100 mg daily. Follow up in 1 month if there are questions or concerns, or in 3 months.   Plan: sertraline (ZOLOFT) 100 MG tablet            The longitudinal plan of care for the diagnosis(es)/condition(s) as documented were addressed during this visit. Due to the added complexity in care, I will continue to support Yudelka in the subsequent management and with ongoing continuity of care.             Subjective   Yudelka is a 39 year old, presenting for the following health issues:  No chief complaint on file.    HPI      Yudelka started sertraline 50 mg daily about a month ago. She likes this medication better than escitalopram which disrupted her sleep and was minimally beneficial for mood. She has more energy and motivation, she feels better, she is sleeping well. She feels there is still room for improvement.     No notable side effects of the medication.                 Objective           Vitals:  No vitals were obtained today due to virtual visit.    Physical Exam   GENERAL: alert and no distress  EYES: Eyes grossly normal to inspection.  No discharge or erythema, or obvious scleral/conjunctival abnormalities.  RESP: No audible wheeze, cough, or visible cyanosis.    SKIN: Visible skin clear. No significant rash, abnormal pigmentation or lesions.  NEURO: Cranial nerves grossly intact.  Mentation and speech appropriate for age.  PSYCH: Appropriate affect, tone, and pace of words          Video-Visit Details    Type of service:  Video Visit   Originating Location (pt. Location): Home    Distant Location (provider location):  On-site  Platform used for Video Visit: Agustin  Signed Electronically by: Karla Tapia MD

## 2025-08-04 DIAGNOSIS — L67.8 ABNORMAL FACIAL HAIR: ICD-10-CM

## 2025-08-05 RX ORDER — SPIRONOLACTONE 50 MG/1
50 TABLET, FILM COATED ORAL 2 TIMES DAILY
Qty: 180 TABLET | Refills: 3 | Status: SHIPPED | OUTPATIENT
Start: 2025-08-05